# Patient Record
Sex: MALE | Race: WHITE | Employment: FULL TIME | URBAN - METROPOLITAN AREA
[De-identification: names, ages, dates, MRNs, and addresses within clinical notes are randomized per-mention and may not be internally consistent; named-entity substitution may affect disease eponyms.]

---

## 2024-04-22 ENCOUNTER — APPOINTMENT (OUTPATIENT)
Dept: GENERAL RADIOLOGY | Facility: CLINIC | Age: 64
End: 2024-04-22
Attending: EMERGENCY MEDICINE
Payer: OTHER MISCELLANEOUS

## 2024-04-22 ENCOUNTER — HOSPITAL ENCOUNTER (EMERGENCY)
Facility: CLINIC | Age: 64
Discharge: HOME | End: 2024-04-22
Attending: EMERGENCY MEDICINE | Admitting: EMERGENCY MEDICINE
Payer: OTHER MISCELLANEOUS

## 2024-04-22 VITALS
WEIGHT: 210 LBS | RESPIRATION RATE: 19 BRPM | OXYGEN SATURATION: 100 % | HEIGHT: 71 IN | SYSTOLIC BLOOD PRESSURE: 143 MMHG | TEMPERATURE: 98.7 F | HEART RATE: 61 BPM | BODY MASS INDEX: 29.4 KG/M2 | DIASTOLIC BLOOD PRESSURE: 94 MMHG

## 2024-04-22 DIAGNOSIS — M25.511 ACUTE PAIN OF RIGHT SHOULDER: ICD-10-CM

## 2024-04-22 DIAGNOSIS — S43.014A ANTERIOR SHOULDER DISLOCATION, RIGHT, INITIAL ENCOUNTER: ICD-10-CM

## 2024-04-22 PROCEDURE — 96376 TX/PRO/DX INJ SAME DRUG ADON: CPT | Performed by: EMERGENCY MEDICINE

## 2024-04-22 PROCEDURE — 99285 EMERGENCY DEPT VISIT HI MDM: CPT | Mod: 25 | Performed by: EMERGENCY MEDICINE

## 2024-04-22 PROCEDURE — 999N000065 XR SHOULDER RIGHT 2 VIEWS: Mod: RT

## 2024-04-22 PROCEDURE — 73030 X-RAY EXAM OF SHOULDER: CPT | Mod: RT

## 2024-04-22 PROCEDURE — 250N000011 HC RX IP 250 OP 636: Performed by: EMERGENCY MEDICINE

## 2024-04-22 PROCEDURE — 99285 EMERGENCY DEPT VISIT HI MDM: CPT | Performed by: EMERGENCY MEDICINE

## 2024-04-22 PROCEDURE — 96374 THER/PROPH/DIAG INJ IV PUSH: CPT | Performed by: EMERGENCY MEDICINE

## 2024-04-22 PROCEDURE — 73060 X-RAY EXAM OF HUMERUS: CPT | Mod: RT

## 2024-04-22 PROCEDURE — 96375 TX/PRO/DX INJ NEW DRUG ADDON: CPT | Performed by: EMERGENCY MEDICINE

## 2024-04-22 RX ORDER — LOSARTAN POTASSIUM 50 MG/1
50 TABLET ORAL EVERY MORNING
COMMUNITY

## 2024-04-22 RX ORDER — SIMVASTATIN 40 MG
40 TABLET ORAL AT BEDTIME
COMMUNITY

## 2024-04-22 RX ORDER — HYDROMORPHONE HYDROCHLORIDE 1 MG/ML
0.5 INJECTION, SOLUTION INTRAMUSCULAR; INTRAVENOUS; SUBCUTANEOUS ONCE
Status: COMPLETED | OUTPATIENT
Start: 2024-04-22 | End: 2024-04-22

## 2024-04-22 RX ORDER — KETOROLAC TROMETHAMINE 15 MG/ML
15 INJECTION, SOLUTION INTRAMUSCULAR; INTRAVENOUS ONCE
Status: COMPLETED | OUTPATIENT
Start: 2024-04-22 | End: 2024-04-22

## 2024-04-22 RX ADMIN — KETOROLAC TROMETHAMINE 15 MG: 15 INJECTION, SOLUTION INTRAMUSCULAR; INTRAVENOUS at 04:56

## 2024-04-22 RX ADMIN — HYDROMORPHONE HYDROCHLORIDE 0.5 MG: 1 INJECTION, SOLUTION INTRAMUSCULAR; INTRAVENOUS; SUBCUTANEOUS at 03:58

## 2024-04-22 RX ADMIN — HYDROMORPHONE HYDROCHLORIDE 0.5 MG: 1 INJECTION, SOLUTION INTRAMUSCULAR; INTRAVENOUS; SUBCUTANEOUS at 04:53

## 2024-04-22 ASSESSMENT — COLUMBIA-SUICIDE SEVERITY RATING SCALE - C-SSRS
2. HAVE YOU ACTUALLY HAD ANY THOUGHTS OF KILLING YOURSELF IN THE PAST MONTH?: NO
1. IN THE PAST MONTH, HAVE YOU WISHED YOU WERE DEAD OR WISHED YOU COULD GO TO SLEEP AND NOT WAKE UP?: NO
6. HAVE YOU EVER DONE ANYTHING, STARTED TO DO ANYTHING, OR PREPARED TO DO ANYTHING TO END YOUR LIFE?: NO

## 2024-04-22 ASSESSMENT — ACTIVITIES OF DAILY LIVING (ADL)
ADLS_ACUITY_SCORE: 35

## 2024-04-22 NOTE — DISCHARGE INSTRUCTIONS
Instructions from your doctor today:  Emergency Department (ED) testing is focused on the potential causes of your symptoms that are the most dangerous possibilities, and cannot cover every possibility. Based on the evaluation, it was deemed sufficiently safe to discharge and continue management through the clinics. Thus, follow-up is very important to assess for improvement/worsening, potential further testing, and potential treatment adjustments. If you were given opioid pain medications or other medications that can make you drowsy while in the ED, you should not drive for at least several hours and not until you feel completely back to normal.     Please make an appointment to follow up with:  - Orthopedics Clinic (phone: [phone]) in about 7 days. A referral has been placed and they should call you to schedule. If you do not get a call by this coming afternoon then call the above number.   - If you do not have a primary care provider, you can be seen in follow-up and establish care by calling any of the clinics below:     - [facility] (phone: [phone])     - Primary Care / [facility] (phone: [phone])   - Have your clinic provider review the results from today's visit with you again, including any potential follow-up or additional testing that may be needed based on the results. Occasionally, incidental findings are found on later review by radiologists that may need follow-up.     Return to the Emergency Department immediately if you have worsening symptoms, or any other urgent health concerns.

## 2024-04-22 NOTE — ED TRIAGE NOTES
Pt fell at work at work and states landed on right side arm got jammed.      Triage Assessment (Adult)       Row Name 04/22/24 0315          Triage Assessment    Airway WDL WDL        Respiratory WDL    Respiratory WDL WDL        Skin Circulation/Temperature WDL    Skin Circulation/Temperature WDL WDL        Cardiac WDL    Cardiac WDL WDL        Peripheral/Neurovascular WDL    Peripheral Neurovascular WDL WDL        Cognitive/Neuro/Behavioral WDL    Cognitive/Neuro/Behavioral WDL WDL

## 2024-04-22 NOTE — LETTER
April 22, 2024      To Whom It May Concern:      Damien Rasmussen was seen in our Emergency Department today, 04/22/24. He may return to work/school with restriction: no use right arm. Restricted for: at least 2 weeks, may be extended by ortho clinic.      Sincerely,        Marek Agrawal MD  Emergency Medicine

## 2024-04-22 NOTE — ED PROVIDER NOTES
"  History     Chief Complaint   Patient presents with    Shoulder Pain     Pt states jpain just below right shoulder after a fall    Arm Pain     Right arm     HPI  Damien Rasmussen is a 63 year old male without pertinent PMH who presents to the ED with right upper arm pain from a fall.  Patient points to right shoulder and upper arm area as location.  Patient reports that about an hour prior to arrival he was at work and pushing an item forward and up over his head.  His feet slipped and he fell forward with his arm extended, landing on the arm.  He denies other areas of injury.  He reports being in normal state of health recently otherwise.  Pain exacerbated with arm movement, has taken no medications yet for pain.    Physical Exam   BP: (!) 164/93  Pulse: 61  Temp: 98  F (36.7  C)  Resp: 19  Height: 180.3 cm (5' 11\")  Weight: 95.3 kg (210 lb)  SpO2: 100 %    Physical Exam  General: Appears uncomfortable, holding right arm. Appears stated age.   HENT: MMM, no oropharyngeal lesions  Eyes: PERRL, normal sclerae   Cardio: Regular rate. Regular rhythm. Extremities well perfused, 2+ radial pulses  Resp: Normal work of breathing, Normal respiratory rate.   MSK: Right upper arm: Palpably anteriorly dislocated, tenderness also along the proximal and mid humerus.  Neuro: alert without signs of confusion. CN II-XII grossly intact. Grossly normal strength and sensation in all extremities. Right hand with intact radial, ulnar, and median motor and sensory function.   Psych: normal affect, normal behavior      ED Course      [facility]    -Dislocation - Upper Extremity    Date/Time: 4/22/2024 6:29 AM    Performed by: Marek Agrawal MD  Authorized by: Marek Agrawal MD    Risks, benefits and alternatives discussed.      LOCATION     Location:  Shoulder    Shoulder location:  R shoulder    Shoulder dislocation type: anterior      Chronicity:  New    PRE PROCEDURE " ASSESSMENT      Pre-procedure imaging:  X-ray    Imaging findings: dislocation present      Imaging findings: no fracture      Distal perfusion: normal      ANESTHESIA (see MAR for exact dosages)      Anesthesia method:  None    PROCEDURE DETAILS      Manipulation performed: yes      Shoulder reduction method:  Traction and counter traction and external rotation    Reduction successful: yes      Reduction confirmed with imaging: yes      Immobilization:  Brace    Supplies used: shoulder immobilizer.    POST PROCEDURE DETAILS     Neurological function: normal      Distal perfusion: normal        PROCEDURE    Patient Tolerance:  Patient tolerated the procedure well with no immediate complications                Labs Ordered and Resulted from Time of ED Arrival to Time of ED Departure - No data to display  Humerus XR, G/E 2 views, right   Final Result   IMPRESSION: An anterior shoulder dislocation is present. No acute fracture. Acromioclavicular joint space is preserved.      XR Shoulder Right 2 Views   Final Result   IMPRESSION: An anterior shoulder dislocation is present. No acute fracture. Acromioclavicular joint space is preserved.      XR Shoulder Right 2 Views    (Results Pending)        Medical Decision Making  The patient's presentation was of moderate complexity (an acute complicated injury).    The patient's evaluation involved:  ordering and/or review of 3+ test(s) in this encounter (see separate area of note for details)  independent interpretation of testing performed by another health professional (XRs)    The patient's management necessitated high risk (a decision regarding emergency major procedure (dislocation reduction)).      Assessments & Plan   Patient presenting with shoulder area pain after fall.  Exam consistent with glenohumeral anterior dislocation, concern for possible associated humerus fracture.     Shoulder and humerus x-rays confirmed dislocation, showed no evidence of fracture.   Discussed multiple options for pain treatment to allow for reduction including IV pain medications, scalene block, and sedation.  Patient in favor of IV pain medications only for initial reduction attempt, wishing to avoid sedation if possible.      In the ED, the patient's symptoms were managed with hydromorphone and ketorolac, with improvement in symptoms upon reassessment.     Reduction performed with initial attempt using muscle massage and external rotation, which was unsuccessful.  Traction/countertraction then used with successful reduction.  Shoulder immobilizer placed.  CMS intact.  Postreduction x-ray confirms successful reduction.    The complete clinical picture is most consistent with right anterior shoulder dislocation. After counseling on the diagnosis, work-up, and treatment plan, the patient was discharged to home with his wife will drive him.  Work note provided.  The patient was advised to follow-up with orthopedics in about a week. The patient was advised to return to the ED if worsening symptoms, or any urgent health concerns.     Final diagnoses:   Anterior shoulder dislocation, right, initial encounter   Acute pain of right shoulder     Discharge Medication List as of 4/22/2024  6:29 AM        --  Marek Agrawal MD   Emergency Medicine   [facility] EMERGENCY DEPARTMENT  4/22/2024       Marek Agrawal MD  04/22/24 0637

## 2024-04-23 ENCOUNTER — OFFICE VISIT (OUTPATIENT)
Dept: ORTHOPEDICS | Facility: CLINIC | Age: 64
End: 2024-04-23
Payer: OTHER MISCELLANEOUS

## 2024-04-23 VITALS — WEIGHT: 210 LBS | HEIGHT: 71 IN | BODY MASS INDEX: 29.4 KG/M2

## 2024-04-23 DIAGNOSIS — S43.014A ANTERIOR SHOULDER DISLOCATION, RIGHT, INITIAL ENCOUNTER: ICD-10-CM

## 2024-04-23 PROCEDURE — 99204 OFFICE O/P NEW MOD 45 MIN: CPT | Performed by: PEDIATRICS

## 2024-04-23 NOTE — LETTER
4/23/2024         RE: Damien Rasmussen  [address] [address]        Dear Colleague,    Thank you for referring your patient, Damien Rasmussen, to the [facility]. Please see a copy of my visit note below.    ASSESSMENT & PLAN    Damien was seen today for injury.    Diagnoses and all orders for this visit:    Anterior shoulder dislocation, right, initial encounter  -     Orthopedic  Referral  -     Cancel: MR Shoulder Right w/o Contrast; Future  -     MR Shoulder Right w/o Contrast; Future      Prognosis not clear. We discussed that if acute significant rotator cuff tear, may refer to ortho surgery.  Continue with support and rest.  MRI next.  Questions answered. Discussed signs and symptoms that may indicate more serious issues; the patient was instructed to seek appropriate care if noted. Damien indicates understanding of these issues and agrees with the plan.    See Patient Instructions  Patient Instructions   Reviewed nature of the right shoulder injury, initial anterior dislocation, with subsequent reduction.  We also discussed potential for soft tissue injury in the shoulder, including related to the rotator cuff, given the dislocation.  Plan to continue with support for the shoulder.  May continue with routine use of the immobilizer, or also reviewed use of sling as alternative.  Plan MRI of the right shoulder next, given trauma to the shoulder and dysfunction.  Plan to remain off work at this time, with updated letter for work provided today.    Advanced imaging is done by appointment. Please call [facility] ([facility]/[place]) [phone] , [facility] ([facility]/[place]) [phone] , or [facility] ([facility]) [phone]  to schedule your MRI.     Some insurance companies may require a prior authorization to be completed which can delay the time until you are able  "to schedule your appointment.       If you are active on [employer], you may have access to your test results before your provider is able to review the study and advise on next steps.      The clinic will contact you with results either via phone or [employer]. If you have not heard from the clinic within 2-3 days following your MRI, please contact us at [phone] or via [employer].  Alternatively, if interested in further discussion with me about MRI findings and next steps, feel free to schedule a telephone visit, video visit, or recheck appointment in clinic.        If you have any further questions for your physician or physician s care team you can contact them thru [employer] or by calling [phone].      [facility]    -----  Chief Complaint   Patient presents with     Right Shoulder - Injury       SUBJECTIVE  Damien Rasmussen is a/an 63 year old male who is seen as an ER referral for evaluation of right shoulder.     The patient is seen by themselves.  The patient is Right handed    Onset: 1 day(s) ago 4/22/24. Patient describes injury as slip and fall while at work.   Location of Pain: right shoulder, lateral shoulder  Worsened by: moving or bumping the shoulder  Better with:   Treatments tried:  Shoulder immobilizer, ibuprofen  Associated symptoms: no distal numbness or tingling; denies swelling or warmth    Orthopedic/Surgical history: NO  Social History/Occupation:     Worker's comp injury      **  Above information per rooming staff.  Additional history:  Lost footing with right arm in flexed position. Dislocated right shoulder, subsequent reduction in ED. Wearing immobilizer.  Currently some throbbing at times right lateral shoulder.  Some anterior right shoulder swelling.          REVIEW OF SYSTEMS:  Review of Systems    OBJECTIVE:  Ht 1.803 m (5' 11\")   Wt 95.3 kg (210 lb)   BMI 29.29 kg/m           Right Shoulder exam    ROM:    "   forward flexion able to initiate, limited, 10-20 deg        abduction able to initiate, limited, 10-20 deg       internal rotation hand to abdomen with some pain       external rotation ~neutral    Tender: lateral shoulder, subacromial area    Strength: able to resist above motions, limited testing with pain    Impingement testing: deferred    Sensation:      normal sensation over shoulder and upper extremity with light touch, including lateral shoulder      RADIOLOGY:  Final results and radiologist's interpretation, available in the [facility] health record.  Images were reviewed with the patient in the office today.  My personal interpretation of the performed imaging: initial anterior shoulder dislocation, with subsequent reduction        EXAM: XR SHOULDER RIGHT 2 VIEWS  LOCATION: [facility]  DATE: 4/22/2024     INDICATION: Right shoulder dislocation. Post reduction film.  COMPARISON: X-ray right shoulder 2 views 04/22/2024 at 0412 hours.                                                                      IMPRESSION: Interval successful reduction of the dislocated right humeral head relative to the glenoid. Anatomic alignment. No fracture, dislocation or x-ray evidence of avascular necrosis.            EXAM: XR SHOULDER RIGHT 2 VIEWS, XR HUMERUS RIGHT G/E 2 VIEWS  LOCATION: [facility]  DATE: 4/22/2024     INDICATION: fall, likely dislocation  COMPARISON: None.                                                                      IMPRESSION: An anterior shoulder dislocation is present. No acute fracture. Acromioclavicular joint space is preserved.          EXAM: XR SHOULDER RIGHT 2 VIEWS, XR HUMERUS RIGHT G/E 2 VIEWS  LOCATION: [facility]  DATE: 4/22/2024     INDICATION: fall, likely dislocation  COMPARISON: None.                                                                       IMPRESSION: An anterior shoulder dislocation is present. No acute fracture. Acromioclavicular joint space is preserved.          Review of prior external note(s) from - ED  Review of the result(s) of each unique test - imaging  Independent interpretation of a test performed by another physician/other qualified health care professional (not separately reported) - imaging  Ordering of each unique test             Again, thank you for allowing me to participate in the care of your patient.        Sincerely,        Robert Suarez DO

## 2024-04-23 NOTE — LETTER
REPORT OF WORK ABILITY    NOTE TO EMPLOYEE: You must promptly provide a copy of this report to your  employer or worker's compensation insurer, and Qualified Rehabilitation Consultant.    Date: 4/23/2024                     Employee Name: Damien Rasmussen         YOB: 1960  Medical Record Number: [id number]   Soc.Sec.No: [id number]  Employer: None                Date of Injury: 4/22/24  Managed Care Organization / Insurance Company Name: UNKNOWN    Diagnosis: right shoulder dislocation  Work Related: yes     MMI: NO  Permanent Partial Disability (PPD) likely: UNKNOWN    EMPLOYEE IS ABLE TO WORK: OFF Work until cleared. Anticipate update on work status within the next 10-14 days.     RESTRICTIONS IF ANY:    Stand:    Sit:    Walk:   Lift, carry no more than:    Push/Pull:    Shoulder/Elbow:    Hand/Wrist:    Ladder/Stair climb:    Reach Above Shoulders:      OTHER RESTRICTIONS: None    TREATMENT PLAN/NOTES: MRI - as ordered, shoulder support routinely, and cold pack for comfort and Rest as needed.          Robert MUSA

## 2024-04-23 NOTE — PROGRESS NOTES
ASSESSMENT & PLAN    Damien was seen today for injury.    Diagnoses and all orders for this visit:    Anterior shoulder dislocation, right, initial encounter  -     Orthopedic  Referral  -     Cancel: MR Shoulder Right w/o Contrast; Future  -     MR Shoulder Right w/o Contrast; Future      Prognosis not clear. We discussed that if acute significant rotator cuff tear, may refer to ortho surgery.  Continue with support and rest.  MRI next.  Questions answered. Discussed signs and symptoms that may indicate more serious issues; the patient was instructed to seek appropriate care if noted. Damien indicates understanding of these issues and agrees with the plan.    See Patient Instructions  Patient Instructions   Reviewed nature of the right shoulder injury, initial anterior dislocation, with subsequent reduction.  We also discussed potential for soft tissue injury in the shoulder, including related to the rotator cuff, given the dislocation.  Plan to continue with support for the shoulder.  May continue with routine use of the immobilizer, or also reviewed use of sling as alternative.  Plan MRI of the right shoulder next, given trauma to the shoulder and dysfunction.  Plan to remain off work at this time, with updated letter for work provided today.    Advanced imaging is done by appointment. Please call [facility] ([facility]/[place]) [phone] , [facility] ([facility]/[place]) [phone] , or [facility] ([facility]) [phone]  to schedule your MRI.     Some insurance companies may require a prior authorization to be completed which can delay the time until you are able to schedule your appointment.       If you are active on [employer], you may have access to your test results before your provider is able to review the study and advise on next steps.      The clinic will contact you with results either via phone or [employer]. If  "you have not heard from the clinic within 2-3 days following your MRI, please contact us at [phone] or via [employer].  Alternatively, if interested in further discussion with me about MRI findings and next steps, feel free to schedule a telephone visit, video visit, or recheck appointment in clinic.        If you have any further questions for your physician or physician s care team you can contact them thru [employer] or by calling [phone].      Robert Suarez [facility]    -----  Chief Complaint   Patient presents with    Right Shoulder - Injury       SUBJECTIVE  Damien Rasmussen is a/an 63 year old male who is seen as an ER referral for evaluation of right shoulder.     The patient is seen by themselves.  The patient is Right handed    Onset: 1 day(s) ago 4/22/24. Patient describes injury as slip and fall while at work.   Location of Pain: right shoulder, lateral shoulder  Worsened by: moving or bumping the shoulder  Better with:   Treatments tried:  Shoulder immobilizer, ibuprofen  Associated symptoms: no distal numbness or tingling; denies swelling or warmth    Orthopedic/Surgical history: NO  Social History/Occupation:     Worker's comp injury      **  Above information per rooming staff.  Additional history:  Lost footing with right arm in flexed position. Dislocated right shoulder, subsequent reduction in ED. Wearing immobilizer.  Currently some throbbing at times right lateral shoulder.  Some anterior right shoulder swelling.          REVIEW OF SYSTEMS:  Review of Systems    OBJECTIVE:  Ht 1.803 m (5' 11\")   Wt 95.3 kg (210 lb)   BMI 29.29 kg/m           Right Shoulder exam    ROM:      forward flexion able to initiate, limited, 10-20 deg        abduction able to initiate, limited, 10-20 deg       internal rotation hand to abdomen with some pain       external rotation ~neutral    Tender: lateral shoulder, subacromial area    Strength: able " to resist above motions, limited testing with pain    Impingement testing: deferred    Sensation:      normal sensation over shoulder and upper extremity with light touch, including lateral shoulder      RADIOLOGY:  Final results and radiologist's interpretation, available in the [facility] health record.  Images were reviewed with the patient in the office today.  My personal interpretation of the performed imaging: initial anterior shoulder dislocation, with subsequent reduction        EXAM: XR SHOULDER RIGHT 2 VIEWS  LOCATION: [facility]  DATE: 4/22/2024     INDICATION: Right shoulder dislocation. Post reduction film.  COMPARISON: X-ray right shoulder 2 views 04/22/2024 at 0412 hours.                                                                      IMPRESSION: Interval successful reduction of the dislocated right humeral head relative to the glenoid. Anatomic alignment. No fracture, dislocation or x-ray evidence of avascular necrosis.            EXAM: XR SHOULDER RIGHT 2 VIEWS, XR HUMERUS RIGHT G/E 2 VIEWS  LOCATION: [facility]  DATE: 4/22/2024     INDICATION: fall, likely dislocation  COMPARISON: None.                                                                      IMPRESSION: An anterior shoulder dislocation is present. No acute fracture. Acromioclavicular joint space is preserved.          EXAM: XR SHOULDER RIGHT 2 VIEWS, XR HUMERUS RIGHT G/E 2 VIEWS  LOCATION: [facility]  DATE: 4/22/2024     INDICATION: fall, likely dislocation  COMPARISON: None.                                                                      IMPRESSION: An anterior shoulder dislocation is present. No acute fracture. Acromioclavicular joint space is preserved.          Review of prior external note(s) from - ED  Review of the result(s) of each unique test - imaging  Independent interpretation of a  test performed by another physician/other qualified health care professional (not separately reported) - imaging  Ordering of each unique test

## 2024-04-23 NOTE — PATIENT INSTRUCTIONS
Reviewed nature of the right shoulder injury, initial anterior dislocation, with subsequent reduction.  We also discussed potential for soft tissue injury in the shoulder, including related to the rotator cuff, given the dislocation.  Plan to continue with support for the shoulder.  May continue with routine use of the immobilizer, or also reviewed use of sling as alternative.  Plan MRI of the right shoulder next, given trauma to the shoulder and dysfunction.  Plan to remain off work at this time, with updated letter for work provided today.    Advanced imaging is done by appointment. Please call [facility] ([facility]/[place]) [phone] , [facility] ([facility]/[place]) [phone] , or [facility] ([facility]) [phone]  to schedule your MRI.     Some insurance companies may require a prior authorization to be completed which can delay the time until you are able to schedule your appointment.       If you are active on [employer], you may have access to your test results before your provider is able to review the study and advise on next steps.      The clinic will contact you with results either via phone or [employer]. If you have not heard from the clinic within 2-3 days following your MRI, please contact us at [phone] or via [employer].  Alternatively, if interested in further discussion with me about MRI findings and next steps, feel free to schedule a telephone visit, video visit, or recheck appointment in clinic.        If you have any further questions for your physician or physician s care team you can contact them thru [employer] or by calling [phone].

## 2024-05-13 ENCOUNTER — ANCILLARY PROCEDURE (OUTPATIENT)
Dept: MRI IMAGING | Facility: CLINIC | Age: 64
End: 2024-05-13
Attending: PEDIATRICS
Payer: OTHER MISCELLANEOUS

## 2024-05-13 DIAGNOSIS — S43.014A ANTERIOR SHOULDER DISLOCATION, RIGHT, INITIAL ENCOUNTER: ICD-10-CM

## 2024-05-13 PROCEDURE — 73221 MRI JOINT UPR EXTREM W/O DYE: CPT | Mod: RT | Performed by: RADIOLOGY

## 2024-05-19 ENCOUNTER — HEALTH MAINTENANCE LETTER (OUTPATIENT)
Age: 64
End: 2024-05-19

## 2024-05-20 ENCOUNTER — OFFICE VISIT (OUTPATIENT)
Dept: ORTHOPEDICS | Facility: CLINIC | Age: 64
End: 2024-05-20
Payer: OTHER MISCELLANEOUS

## 2024-05-20 DIAGNOSIS — S46.011D TRAUMATIC COMPLETE TEAR OF RIGHT ROTATOR CUFF, SUBSEQUENT ENCOUNTER: Primary | ICD-10-CM

## 2024-05-20 DIAGNOSIS — S43.014D ANTERIOR DISLOCATION OF RIGHT SHOULDER, SUBSEQUENT ENCOUNTER: ICD-10-CM

## 2024-05-20 PROCEDURE — 99213 OFFICE O/P EST LOW 20 MIN: CPT | Performed by: PEDIATRICS

## 2024-05-20 NOTE — LETTER
5/20/2024         RE: Damien Rasmussen  [address] [facility] [address]        Dear Colleague,    Thank you for referring your patient, Damien Rasmussen, to the [facility]. Please see a copy of my visit note below.    ASSESSMENT & PLAN    Damien was seen today for follow up.    Diagnoses and all orders for this visit:    Traumatic complete tear of right rotator cuff, subsequent encounter  -     Orthopedic  Referral; Future    Anterior dislocation of right shoulder, subsequent encounter  -     Orthopedic  Referral; Future      See Patient Instructions  Patient Instructions   Reviewed right shoulder MRI, demonstrating full-thickness tendon tearing the rotator cuff.  With these findings, advise further discussion with orthopedic surgery next, referral has been placed through orthopedic .  Continue with previously placed work restrictions, remaining off work for now given nature of dysfunction after this injury.  Will leave follow-up here open-ended.  Contact clinic for any other questions or concerns.    If you have any further questions for your physician or physician s care team you can contact them thru [employer] [email or web address] or by calling [phone].      Robert Suarez,   [facility]    SUBJECTIVE- Interim History May 20, 2024    Chief Complaint   Patient presents with     Right Shoulder - Follow Up       Damien Rasmussen is a 63 year old male who is seen in f/u up for right shoulder injury. Since last visit on 4/23/24 patient has felt slight improvement in pain, as well as the bruising (located primarily in the upper arm today), but still has painful motion with reaching or lifting the arm. No interim fall or injury.    - Now ~ 1 month (-) from initial onset (4/22/24)    The patient is seen by themselves.  The patient is Right handed    Orthopedic/Surgical history: NO  Social History/Occupation: machine        REVIEW OF SYSTEMS:  Review of Systems    OBJECTIVE:  There were no vitals taken for this visit.       Right shoulder with limited active motion away from body, with pain    RADIOLOGY:  Final results and radiologist's interpretation, available in the [facility] health record.  Images were reviewed with the patient in the office today.  My personal interpretation of the performed imaging: full thickness rotator cuff tendon tearing.      Exam: MRI of the right shoulder dated 5/15/2024.     COMPARISON: Radiographs dated 4/22/2024.     CLINICAL HISTORY: Anterior shoulder dislocation.     TECHNIQUE: Multiplanar, multisequence MR imaging of the right shoulder  was obtained using standard sequences and orthogonal plane without the  use of intravenous or intra-articular gadolinium contrast.     FINDINGS:     Small amount of fluid in the right shoulder glenohumeral joint.  Moderate amount of fluid in the subacromial subdeltoid bursa with  minimal fluid in the subscapularis recess.     No marrow signal abnormalities to suggest fracture or marrow  infiltration.     At least moderate osteoarthrosis at the acromioclavicular joint. No os  acromiale. The coracohumeral, coracoclavicular, and coracoacromial  ligaments are intact. Preserved subcoracoid fat. Mild thickening of  the inferior glenohumeral ligaments.     There is a full-thickness, complete tear of the supraspinatus and  infraspinatus tendons, with retraction of the torn fibers to the level  of the glenohumeral joint. The teres minor tendon is intact. Severe  tendinosis of the subscapularis tendon with marked thickening and  edema, and partial-thickness tearing.      Minimal fatty infiltration within the rotator cuff musculature,  greatest within the teres minor muscle. Minimal soft tissue edema.     There is tendinosis of the biceps tendon, which is subluxed at the  bicipital groove. Attenuation of the intra-articular portion of the  biceps tendon.     The humeral  head is well aligned with the glenoid. Small likely  chronic Hill-Sachs deformity. Fraying of the articular cartilage along  the glenoid. Marked degenerative tearing of the labrum. Questionable  subtle bone marrow edema along the anterior inferior glenoid without  true bony Bankart lesion.                                                                      IMPRESSION:  1. Small right shoulder joint effusion with mild synovitis.     2. Moderate osteoarthrosis of the right shoulder acromioclavicular  joint.     3. Full-thickness, complete tears of the right shoulder supraspinatus  and infraspinatus tendons with retraction of the torn fibers to the  level of the glenohumeral joint.     3. Severe tendinosis of the right shoulder subscapularis tendon with  marked increased signal and thickening and partial thickness tearing.     4. Mild fatty infiltration within the rotator cuff musculature,  greatest within the teres minor muscle.     5. Subluxation of the biceps tendon at the level of the bicipital  groove with tendinosis. The intra-articular portion of the biceps  tendon is attenuated and likely partially torn.     6. Likely chronic Hill-Sachs deformity. No definite bony Bankart  injury although there is a subtle focus of bone marrow edema in the  anterior inferior glenoid. Diffuse labral tearing.     CLARK REYNOLDS MD         Again, thank you for allowing me to participate in the care of your patient.        Sincerely,        Robert Suarez,

## 2024-05-20 NOTE — PROGRESS NOTES
ASSESSMENT & PLAN    Damien was seen today for follow up.    Diagnoses and all orders for this visit:    Traumatic complete tear of right rotator cuff, subsequent encounter  -     Orthopedic  Referral; Future    Anterior dislocation of right shoulder, subsequent encounter  -     Orthopedic  Referral; Future      See Patient Instructions  Patient Instructions   Reviewed right shoulder MRI, demonstrating full-thickness tendon tearing the rotator cuff.  With these findings, advise further discussion with orthopedic surgery next, referral has been placed through orthopedic .  Continue with previously placed work restrictions, remaining off work for now given nature of dysfunction after this injury.  Will leave follow-up here open-ended.  Contact clinic for any other questions or concerns.    If you have any further questions for your physician or physician s care team you can contact them thru [employer] or by calling [phone].      Robert Suarez, [facility]    SUBJECTIVE- Interim History May 20, 2024    Chief Complaint   Patient presents with    Right Shoulder - Follow Up       Damien Rasmussen is a 63 year old male who is seen in f/u up for right shoulder injury. Since last visit on 4/23/24 patient has felt slight improvement in pain, as well as the bruising (located primarily in the upper arm today), but still has painful motion with reaching or lifting the arm. No interim fall or injury.    - Now ~ 1 month (-) from initial onset (4/22/24)    The patient is seen by themselves.  The patient is Right handed    Orthopedic/Surgical history: NO  Social History/Occupation:       REVIEW OF SYSTEMS:  Review of Systems    OBJECTIVE:  There were no vitals taken for this visit.       Right shoulder with limited active motion away from body, with pain    RADIOLOGY:  Final results and radiologist's interpretation, available in the [facility]  record.  Images were reviewed with the patient in the office today.  My personal interpretation of the performed imaging: full thickness rotator cuff tendon tearing.      Exam: MRI of the right shoulder dated 5/15/2024.     COMPARISON: Radiographs dated 4/22/2024.     CLINICAL HISTORY: Anterior shoulder dislocation.     TECHNIQUE: Multiplanar, multisequence MR imaging of the right shoulder  was obtained using standard sequences and orthogonal plane without the  use of intravenous or intra-articular gadolinium contrast.     FINDINGS:     Small amount of fluid in the right shoulder glenohumeral joint.  Moderate amount of fluid in the subacromial subdeltoid bursa with  minimal fluid in the subscapularis recess.     No marrow signal abnormalities to suggest fracture or marrow  infiltration.     At least moderate osteoarthrosis at the acromioclavicular joint. No os  acromiale. The coracohumeral, coracoclavicular, and coracoacromial  ligaments are intact. Preserved subcoracoid fat. Mild thickening of  the inferior glenohumeral ligaments.     There is a full-thickness, complete tear of the supraspinatus and  infraspinatus tendons, with retraction of the torn fibers to the level  of the glenohumeral joint. The teres minor tendon is intact. Severe  tendinosis of the subscapularis tendon with marked thickening and  edema, and partial-thickness tearing.      Minimal fatty infiltration within the rotator cuff musculature,  greatest within the teres minor muscle. Minimal soft tissue edema.     There is tendinosis of the biceps tendon, which is subluxed at the  bicipital groove. Attenuation of the intra-articular portion of the  biceps tendon.     The humeral head is well aligned with the glenoid. Small likely  chronic Hill-Sachs deformity. Fraying of the articular cartilage along  the glenoid. Marked degenerative tearing of the labrum. Questionable  subtle bone marrow edema along the anterior inferior glenoid without  true bony  Bankart lesion.                                                                      IMPRESSION:  1. Small right shoulder joint effusion with mild synovitis.     2. Moderate osteoarthrosis of the right shoulder acromioclavicular  joint.     3. Full-thickness, complete tears of the right shoulder supraspinatus  and infraspinatus tendons with retraction of the torn fibers to the  level of the glenohumeral joint.     3. Severe tendinosis of the right shoulder subscapularis tendon with  marked increased signal and thickening and partial thickness tearing.     4. Mild fatty infiltration within the rotator cuff musculature,  greatest within the teres minor muscle.     5. Subluxation of the biceps tendon at the level of the bicipital  groove with tendinosis. The intra-articular portion of the biceps  tendon is attenuated and likely partially torn.     6. Likely chronic Hill-Sachs deformity. No definite bony Bankart  injury although there is a subtle focus of bone marrow edema in the  anterior inferior glenoid. Diffuse labral tearing.     CLARK REYNOLDS MD

## 2024-05-20 NOTE — PATIENT INSTRUCTIONS
Reviewed right shoulder MRI, demonstrating full-thickness tendon tearing the rotator cuff.  With these findings, advise further discussion with orthopedic surgery next, referral has been placed through orthopedic .  Continue with previously placed work restrictions, remaining off work for now given nature of dysfunction after this injury.  Will leave follow-up here open-ended.  Contact clinic for any other questions or concerns.    If you have any further questions for your physician or physician s care team you can contact them thru [email or web address] or by calling [phone].

## 2024-05-20 NOTE — LETTER
REPORT OF WORK ABILITY    NOTE TO EMPLOYEE: You must promptly provide a copy of this report to your  employer or worker's compensation insurer, and Qualified Rehabilitation Consultant.    Date: May 20, 2024                     Employee Name: Damien Rasmussen         YOB: 1960  Medical Record Number: [id number]   Soc.Sec.No: [id number]  Employer: None                Date of Injury: 4/22/24  Managed Care Organization / Insurance Company Name: UNKNOWN    Diagnosis: right shoulder dislocation with full thickness rotator cuff tendon tearing  Work Related: yes     MMI: NO  Permanent Partial Disability (PPD) likely: UNKNOWN    EMPLOYEE IS ABLE TO WORK: OFF Work until cleared.      RESTRICTIONS IF ANY:    Stand:    Sit:    Walk:   Lift, carry no more than:    Push/Pull:    Shoulder/Elbow:    Hand/Wrist:    Ladder/Stair climb:    Reach Above Shoulders:      OTHER RESTRICTIONS: None    TREATMENT PLAN/NOTES: may shoulder support routinely, and cold pack for comfort and Rest as needed. Referred to orthopedic surgery for further discussion next.          Robert MUSA

## 2024-05-28 NOTE — PROGRESS NOTES
SUBJECTIVE:  Damien Rasmussen is a 63 year old male who is seen in consultation at the request of Dr. Suarez for a right shoulder injury that occurred on 4/22/24.  Shoulder dislocation: he was at work and pushing an item forward and up over his head. His feet slipped and he fell forward with his arm extended, landing on the arm   Anterior shoulder dislocation was reduced in the ER.    Present symptoms: cannot lift the right arm due to weakness  Pain is mild  Associated symptoms: no neuro symptoms     Treatment up to this point: shoulder immobilizer x 3 weeks.    Ortho PMH:  reports no previous shoulder problems or injuries in the past.    Past Medical History: No past medical history on file.  Past Surgical History: No past surgical history on file.  Family History: No family history on file.  Social History:   Social History     Tobacco Use    Smoking status: Former     Types: Cigarettes    Smokeless tobacco: Never   Substance Use Topics    Alcohol use: Not Currently       Review of Systems:  Constitutional:  NEGATIVE for fever, chills, change in weight  Integumentary/Skin:  NEGATIVE for worrisome rashes, moles or lesions  Eyes:  NEGATIVE for vision changes or irritation  ENT/Mouth:  NEGATIVE for ear, mouth and throat problems  Resp:  NEGATIVE for significant cough or SOB  Breast:  NEGATIVE for masses, tenderness or discharge  CV:  NEGATIVE for chest pain, palpitations or peripheral edema  GI:  NEGATIVE for nausea, abdominal pain, heartburn, or change in bowel habits  :  Negative   Musculoskeletal:  See HPI above  Neuro:  NEGATIVE for weakness, dizziness or paresthesias  Endocrine:  NEGATIVE for temperature intolerance, skin/hair changes  Heme/allergy/immune:  NEGATIVE for bleeding problems  Psychiatric:  NEGATIVE for changes in mood or affect    OBJECTIVE:  Physical Exam:  /75 (BP Location: Left arm, Patient Position: Sitting, Cuff Size: Adult Regular)   Pulse 69   SpO2 98%   General Appearance: healthy,  alert and no distress   Skin: no suspicious lesions or rashes  Neuro: Normal strength and tone, mentation intact and speech normal  Vascular: good pulses, and cappillary refill   Lymph: no lymphadenopathy   Psych:  mentation appears normal and affect normal/bright  Resp: no increased work of breathing    Neck Exam:  Cervical range of motion: full and does not cause neck pain or reproduce shoulder pain.  Sensory deficits: none  Motor deficits: none    Right Shoulder Exam:  Shoulder Inspection: no swelling, bruising, discoloration, or obvious deformity or asymmetry. Biceps madhavi deformity noted.  Infraspinatus muscle atrophy on right     Tender: Non-tender  Range of Motion:   Active: forward flexion: 30, internal rotation: hip pocket   Passive: forward flexion: 100*, external rotation: 30*  Strength: forward flexion: 3/5, external rotation: 3/5, belly press weak      X-rays:  Obtained Date of injury, were reviewed in the office with the patient:   successful reduction of the anteriorly dislocated right humeral head relative to the glenoid. Anatomic alignment. No fracture, dislocation       MRI: right shoulder  From 5/20/24 showed:  1. Small right shoulder joint effusion with mild synovitis.     2. Moderate osteoarthrosis of the right shoulder acromioclavicular  joint.     3. Full-thickness, complete tears of the right shoulder supraspinatus  and infraspinatus tendons with retraction of the torn fibers to the  level of the glenohumeral joint.     3. Severe tendinosis of the right shoulder subscapularis tendon with  marked increased signal and thickening and partial thickness tearing.    There appears to be a tear in the subscap to my view, at least the upper portion.     4. Mild fatty infiltration within the rotator cuff musculature,  greatest within the teres minor muscle.     5. Subluxation of the biceps tendon at the level of the bicipital  groove with tendinosis. The intra-articular portion of the biceps  tendon  is attenuated and likely partially torn.  The tendon appears torn to me.     6. Small likely chronic Hill-Sachs deformity. Fraying of the articular cartilage along  the glenoid. Marked degenerative tearing of the labrum. Questionable  subtle bone marrow edema along the anterior inferior glenoid without  true bony Bankart lesion.      ASSESSMENT:  Right shoulder 1st time dislocation with acute, very large rotator cuff tear involving the supraspinatus, infraspinatus, and at least part of the subscapularis.  Proximal biceps rupture and the expected labrum tearing is present.    Based on the appearance of the rotator cuff with retraction and infraspinatus atrophy mainly seen on exam, this would suggest that at least part of the tear may have been there for a while, making it easier for the glenohumeral joint to dislocate.  As I told the patient and his wife, this determination is very difficult to make.    PLAN:  I think rotator cuff repair should be attempted.  Acute repair has a higher success rate, so I don't think he should wait too long to have this.   Discussed that the biceps is likely not repairable, and the labrum can be debrided, but likely should not be repaired to avoid over tightening the shoulder.  We discussed that recovery will be quite long, and he will be out of work using that arm for possibly 6-9 months. He has a physical job.  I ordered some physical therapy to try to get some range of motion back.  Because of the massive nature of the tear, including the subscap, and the question of chronicity of the tear, I have referred him on to the shoulder service       WILNER Green MD  Dept. Orthopedic Surgery  [facility]

## 2024-05-29 ENCOUNTER — OFFICE VISIT (OUTPATIENT)
Dept: ORTHOPEDICS | Facility: CLINIC | Age: 64
End: 2024-05-29
Attending: PEDIATRICS
Payer: OTHER MISCELLANEOUS

## 2024-05-29 VITALS — OXYGEN SATURATION: 98 % | DIASTOLIC BLOOD PRESSURE: 75 MMHG | HEART RATE: 69 BPM | SYSTOLIC BLOOD PRESSURE: 139 MMHG

## 2024-05-29 DIAGNOSIS — M75.121 COMPLETE TEAR OF RIGHT ROTATOR CUFF, UNSPECIFIED WHETHER TRAUMATIC: ICD-10-CM

## 2024-05-29 DIAGNOSIS — Z02.6 ENCOUNTER RELATED TO WORKER'S COMPENSATION CLAIM: ICD-10-CM

## 2024-05-29 DIAGNOSIS — S46.111A RUPTURE LONG HEAD BICEPS TENDON, RIGHT, INITIAL ENCOUNTER: ICD-10-CM

## 2024-05-29 DIAGNOSIS — S43.431A TEAR OF RIGHT GLENOID LABRUM, INITIAL ENCOUNTER: ICD-10-CM

## 2024-05-29 DIAGNOSIS — S46.011D TRAUMATIC COMPLETE TEAR OF RIGHT ROTATOR CUFF, SUBSEQUENT ENCOUNTER: ICD-10-CM

## 2024-05-29 DIAGNOSIS — S43.014D ANTERIOR DISLOCATION OF RIGHT SHOULDER, SUBSEQUENT ENCOUNTER: ICD-10-CM

## 2024-05-29 DIAGNOSIS — S43.004A DISLOCATION OF RIGHT SHOULDER JOINT, INITIAL ENCOUNTER: Primary | ICD-10-CM

## 2024-05-29 PROCEDURE — 99204 OFFICE O/P NEW MOD 45 MIN: CPT | Performed by: ORTHOPAEDIC SURGERY

## 2024-05-29 ASSESSMENT — PAIN SCALES - GENERAL: PAINLEVEL: MILD PAIN (3)

## 2024-05-29 NOTE — LETTER
5/29/2024         RE: Damien Rasmussen  [address] [facility] [address]        Dear Colleague,    Thank you for referring your patient, Damien Rasmussen, to the [facility]. Please see a copy of my visit note below.    SUBJECTIVE:  Damien Rasmussen is a 63 year old male who is seen in consultation at the request of Dr. Suarez for a right shoulder injury that occurred on 4/22/24.  Shoulder dislocation: he was at work and pushing an item forward and up over his head. His feet slipped and he fell forward with his arm extended, landing on the arm   Anterior shoulder dislocation was reduced in the ER.    Present symptoms: cannot lift the right arm due to weakness  Pain is mild  Associated symptoms: no neuro symptoms     Treatment up to this point: shoulder immobilizer x 3 weeks.    Ortho PMH:  reports no previous shoulder problems or injuries in the past.    Past Medical History: No past medical history on file.  Past Surgical History: No past surgical history on file.  Family History: No family history on file.  Social History:   Social History     Tobacco Use     Smoking status: Former     Types: Cigarettes     Smokeless tobacco: Never   Substance Use Topics     Alcohol use: Not Currently       Review of Systems:  Constitutional:  NEGATIVE for fever, chills, change in weight  Integumentary/Skin:  NEGATIVE for worrisome rashes, moles or lesions  Eyes:  NEGATIVE for vision changes or irritation  ENT/Mouth:  NEGATIVE for ear, mouth and throat problems  Resp:  NEGATIVE for significant cough or SOB  Breast:  NEGATIVE for masses, tenderness or discharge  CV:  NEGATIVE for chest pain, palpitations or peripheral edema  GI:  NEGATIVE for nausea, abdominal pain, heartburn, or change in bowel habits  :  Negative   Musculoskeletal:  See HPI above  Neuro:  NEGATIVE for weakness, dizziness or paresthesias  Endocrine:  NEGATIVE for temperature intolerance, skin/hair changes  Heme/allergy/immune:  NEGATIVE  for bleeding problems  Psychiatric:  NEGATIVE for changes in mood or affect    OBJECTIVE:  Physical Exam:  /75 (BP Location: Left arm, Patient Position: Sitting, Cuff Size: Adult Regular)   Pulse 69   SpO2 98%   General Appearance: healthy, alert and no distress   Skin: no suspicious lesions or rashes  Neuro: Normal strength and tone, mentation intact and speech normal  Vascular: good pulses, and cappillary refill   Lymph: no lymphadenopathy   Psych:  mentation appears normal and affect normal/bright  Resp: no increased work of breathing    Neck Exam:  Cervical range of motion: full and does not cause neck pain or reproduce shoulder pain.  Sensory deficits: none  Motor deficits: none    Right Shoulder Exam:  Shoulder Inspection: no swelling, bruising, discoloration, or obvious deformity or asymmetry. Biceps madhavi deformity noted.  Infraspinatus muscle atrophy on right     Tender: Non-tender  Range of Motion:   Active: forward flexion: 30, internal rotation: hip pocket   Passive: forward flexion: 100*, external rotation: 30*  Strength: forward flexion: 3/5, external rotation: 3/5, belly press weak      X-rays:  Obtained Date of injury, were reviewed in the office with the patient:   successful reduction of the anteriorly dislocated right humeral head relative to the glenoid. Anatomic alignment. No fracture, dislocation       MRI: right shoulder  From 5/20/24 showed:  1. Small right shoulder joint effusion with mild synovitis.     2. Moderate osteoarthrosis of the right shoulder acromioclavicular  joint.     3. Full-thickness, complete tears of the right shoulder supraspinatus  and infraspinatus tendons with retraction of the torn fibers to the  level of the glenohumeral joint.     3. Severe tendinosis of the right shoulder subscapularis tendon with  marked increased signal and thickening and partial thickness tearing.    There appears to be a tear in the subscap to my view, at least the upper portion.     4.  Mild fatty infiltration within the rotator cuff musculature,  greatest within the teres minor muscle.     5. Subluxation of the biceps tendon at the level of the bicipital  groove with tendinosis. The intra-articular portion of the biceps  tendon is attenuated and likely partially torn.  The tendon appears torn to me.     6. Small likely chronic Hill-Sachs deformity. Fraying of the articular cartilage along  the glenoid. Marked degenerative tearing of the labrum. Questionable  subtle bone marrow edema along the anterior inferior glenoid without  true bony Bankart lesion.      ASSESSMENT:  Right shoulder 1st time dislocation with acute, very large rotator cuff tear involving the supraspinatus, infraspinatus, and at least part of the subscapularis.  Proximal biceps rupture and the expected labrum tearing is present.    Based on the appearance of the rotator cuff with retraction and infraspinatus atrophy mainly seen on exam, this would suggest that at least part of the tear may have been there for a while, making it easier for the glenohumeral joint to dislocate.  As I told the patient and his wife, this determination is very difficult to make.    PLAN:  I think rotator cuff repair should be attempted.  Acute repair has a higher success rate, so I don't think he should wait too long to have this.   Discussed that the biceps is likely not repairable, and the labrum can be debrided, but likely should not be repaired to avoid over tightening the shoulder.  We discussed that recovery will be quite long, and he will be out of work using that arm for possibly 6-9 months. He has a physical job.  I ordered some physical therapy to try to get some range of motion back.  Because of the massive nature of the tear, including the subscap, and the question of chronicity of the tear, I have referred him on to the shoulder service       WILNER Green MD  Dept. Orthopedic Surgery  [facility]        Again, thank you  for allowing me to participate in the care of your patient.        Sincerely,        Thiago Green MD

## 2024-05-30 ENCOUNTER — TELEPHONE (OUTPATIENT)
Dept: ORTHOPEDICS | Facility: CLINIC | Age: 64
End: 2024-05-30
Payer: COMMERCIAL

## 2024-05-30 ASSESSMENT — ACTIVITIES OF DAILY LIVING (ADL)
REACHING_FOR_SOMETHING_ON_A_HIGH_SHELF: 7
WASHING_YOUR_HAIR?: 10
PLACING_AN_OBJECT_ON_A_HIGH_SHELF: 10
PUTTING_ON_YOUR_PANTS: 9
WHEN_LYING_ON_THE_INVOLVED_SIDE: 3
PUTTING_ON_A_SHIRT_THAT_BUTTONS_DOWN_THE_FRONT: 7
PLEASE_INDICATE_YOR_PRIMARY_REASON_FOR_REFERRAL_TO_THERAPY:: SHOULDER
TOUCHING_THE_BACK_OF_YOUR_NECK: 6
WASHING_YOUR_BACK: 10
REMOVING_SOMETHING_FROM_YOUR_BACK_POCKET: 5
CARRYING_A_HEAVY_OBJECT_OF_10_POUNDS: 9
PUSHING_WITH_THE_INVOLVED_ARM: 3
AT_ITS_WORST?: 3
PUTTING_ON_AN_UNDERSHIRT_OR_A_PULLOVER_SWEATER: 7

## 2024-05-30 NOTE — TELEPHONE ENCOUNTER
Form completed for: Damien Rasmussen  What was done with form: faxed requested information to include 5/29/24 OV note ffrom Dr Green to Chad Casetllanos.       Moncho BURDICK

## 2024-05-30 NOTE — TELEPHONE ENCOUNTER
[facility] Call Center    Phone Message    May a detailed message be left on voicemail: yes     Reason for Call: Other: Work comp  is calling she says that the appt schedule for Monday is not approved as she has not gotten the  records as of yet. Fax number is [phone]     Action Taken: Other: te    Travel Screening: Not Applicable     Date of Service:

## 2024-05-31 ENCOUNTER — THERAPY VISIT (OUTPATIENT)
Dept: PHYSICAL THERAPY | Facility: CLINIC | Age: 64
End: 2024-05-31
Attending: ORTHOPAEDIC SURGERY
Payer: OTHER MISCELLANEOUS

## 2024-05-31 DIAGNOSIS — M25.511 ACUTE PAIN OF RIGHT SHOULDER: Primary | ICD-10-CM

## 2024-05-31 DIAGNOSIS — S43.004A DISLOCATION OF RIGHT SHOULDER JOINT, INITIAL ENCOUNTER: ICD-10-CM

## 2024-05-31 DIAGNOSIS — M75.121 COMPLETE TEAR OF RIGHT ROTATOR CUFF, UNSPECIFIED WHETHER TRAUMATIC: ICD-10-CM

## 2024-05-31 DIAGNOSIS — Z02.6 ENCOUNTER RELATED TO WORKER'S COMPENSATION CLAIM: ICD-10-CM

## 2024-05-31 DIAGNOSIS — S43.004A DISLOCATION OF RIGHT SHOULDER JOINT, INITIAL ENCOUNTER: Primary | ICD-10-CM

## 2024-05-31 PROBLEM — M25.519 SHOULDER PAIN: Status: ACTIVE | Noted: 2024-05-31

## 2024-05-31 PROCEDURE — 97161 PT EVAL LOW COMPLEX 20 MIN: CPT | Mod: GP | Performed by: PHYSICAL THERAPIST

## 2024-05-31 PROCEDURE — 97110 THERAPEUTIC EXERCISES: CPT | Mod: GP | Performed by: PHYSICAL THERAPIST

## 2024-05-31 NOTE — PROGRESS NOTES
CHIEF COMPLAINT: Right shoulder pain    DIAGNOSIS: Right shoulder massive rotator cuff tear after dislocation event    OCCUPATION/SPORT: Work comp -     HPI:   Damien Rasmussen is a very pleasant 63 year old, right-hand dominant male who presents for evaluation of right shoulder pain.  Symptoms started on 4/22/2024. There was a precipitating event where he was at work and pushing an item forward and up over his head. His feet slipped and he fell forward with his arm extended, landing on the arm and dislocated his shoulder.  The anterior shoulder dislocation was reduced in the ER. The pain is located to the posterior and lateral arm. Worst pain is rated a 4 of 10, and current pain is rated at 0 of 10. Symptoms are worsened by movement and usage. Symptoms are improved with stretching. Patient has tried physical therapy and a shoulder immobilizer with good relief. Associated symptoms include pain and weakness. Patient has pain radiating down the arm to the mid humerus, with no numbness. Notably, the patient has had no history of shoulder injuries or surgeries in the past.  This is his first time dislocating. No other concerns or complaints at this time. SANE score R - 20 L - 100.    PAST MEDICAL HISTORY:  Past Medical History:   Diagnosis Date    Anterior shoulder dislocation, right, sequela 04/22/2024    Encounter related to worker's compensation claim 04/22/2024       PAST SURGICAL HISTORY:  No past surgical history on file.    CURRENT MEDICATIONS:  Current Outpatient Medications   Medication Sig Dispense Refill    losartan (COZAAR) 50 MG tablet Take 50 mg by mouth daily      simvastatin (ZOCOR) 40 MG tablet Take 40 mg by mouth at bedtime Pt unsure of dose         ALLERGIES:    No Known Allergies      FAMILY HISTORY: No pertinent family history, reviewed in EMR.    SOCIAL HISTORY:   Social History     Socioeconomic History    Marital status:      Spouse name: Not on file    Number of children: Not  on file    Years of education: Not on file    Highest education level: Not on file   Occupational History    Not on file   Tobacco Use    Smoking status: Former     Types: Cigarettes    Smokeless tobacco: Never   Substance and Sexual Activity    Alcohol use: Not Currently    Drug use: Not Currently    Sexual activity: Not on file   Other Topics Concern    Not on file   Social History Narrative    Not on file     Social Determinants of Health     Financial Resource Strain: Not on file   Food Insecurity: Not on file   Transportation Needs: Not on file   Physical Activity: Not on file   Stress: Not on file   Social Connections: Not on file   Interpersonal Safety: Not on file   Housing Stability: Not on file       REVIEW OF SYSTEMS: Positive for that noted in past medical history and history of present illness and otherwise reviewed in EMR    PHYSICAL EXAM:  Patient is Data Unavailable and weighs 0 lbs 0 oz There were no vitals taken for this visit.  There is no height or weight on file to calculate BMI.   Constitutional: Well-developed, well-nourished, healthy appearing male.  Skin: Warm, dry   HEENT: Normal  Cardiac: Well perfused extremities, strong 2+ peripheral pulses. No edema.   Pulmonary: Breathing room air    Musculoskeletal:   Right Shoulder:  AROM right shoulder: 20/20/0/hip   AROM left shoulder: 160/160/60/T12   PROM right shoulder: 100/60/30/L5   3/5 supraspinatus, 3/5 infraspinatus, 4/5 subscapularis  no AC joint pain, negative cross body adduction  positive Neer and Burton impingement signs  Neurovascular exam and cervical spine exam are normal.    X-RAYS:     ADVANCED IMAGING: I personally reviewed the prior x-rays and MRI which shows that the humeral head is centered.  There is a massive tear involving the supraspinatus infraspinatus and subscapularis, there is grade 1 fatty atrophy.  There is subluxation of the long head of the biceps tendon.    IMPRESSION: 63 year old-year-old right hand dominant  male, with right shoulder massive rotator cuff tear after dislocation.     PLAN:     I discussed with the patient the etiology of their condition. We discussed at length the options as noted above.  I discussed with the patient the results of the MRI.  The patient has a massive rotator cuff tear after work-related dislocation event.  Given the large nature of the tear as well as the patient's active lifestyle working as a  and no antecedent pain I recommended surgical fixation.  The patient does not statically fixed and has no signs of rotator cuff arthropathy, there is very mild atrophy.  We discussed risk and benefits of surgery as well as alternatives.  I also discussed with the patient that he has developed significant stiffness, we discussed that the patient needs to go through extensive preoperative exercises and physical therapy to work on the stiffness, if this is not resolved before surgery we would have to delay surgery until his range of motion has improved passively.  We discussed this is because with surgical fixation he would be at high risk for postoperative stiffness without full passive range of motion beforehand.  We also discussed given the large nature of the tear the chance of a retear, not completely healing, residual pain, weakness, dysfunction.  We also discussed the use of potential allograft augmentation.  The patient wishes to proceed forward with surgery, is going to be scheduled for stat preoperative physical therapy at the [facility] [place], will need Worker's Compensation approval    After going over these options, Damien would like to proceed with right shoulder arthroscopy, subacromial decompression, rotator cuff repair and related procedures. We reviewed the risks and benefits of surgery including but not limited to the following: Risk of anesthesia, including death; infection; nerve/tendon/vessel injury; deep venous thrombosis (DVT), pulmonary embolism (PE); shoulder  stiffness, possible need to treat the biceps tendon including but not limited to tenotomy (cutting the tendon) or arthroscopic biceps tenodesis (securing the tendon into a bone socket in the humerus through an arthroscopic incision), possible need to address the acromioclavicular joint (AC joint); rotator cuff repair non-healing or re-tear; hardware- related problems; potential of rotator cuff repair irreparability (not able to repair the torn tendons), potential of the procedure to not alleviate the condition and continued pain; and the potential need for further surgery in the future. We also discussed the possible use of biologic augmentation with a collagen scaffold or an allograft dermal (skin) graft depending on the tissue quality, tear size, and intraoperative determination of healing potential.     After going over these risks, benefits and alternatives Damien would like to proceed with surgery. All of his questions were answered satisfactorily and he signed the surgical consent form to proceed. All appropriate paperwork was completed and he will work with our surgical scheduling department to coordinate the surgery.      At the conclusion of the office visit, Damien verbally acknowledged that I answered all of his questions satisfactorily.    Amalia Juan MD  Orthopedic Surgery Sports Medicine and Shoulder Surgery

## 2024-05-31 NOTE — PROGRESS NOTES
PHYSICAL THERAPY EVALUATION  Type of Visit: Evaluation    See electronic medical record for Abuse and Falls Screening details.    Subjective       Presenting condition or subjective complaint: Rotator cuff tear  Dislocated anterior at work 4/22, since has had MRI showing large RC tear. Painful at times. Can't lift arm. Unable to work at this time. Immobilizer x 3 weeks after injury.  Per MD note 5/29 w/ Dr Green will consult w/ Dr Marin for surgery. Goal improve ROM in meantime.   Damien reports he has been working on squeezing stress ball/working on  strength.   Right side dominant.  Date of onset: 04/22/24    Relevant medical history: High blood pressure; Overweight   Dates & types of surgery: none    Prior diagnostic imaging/testing results: MRI; X-ray     Prior therapy history for the same diagnosis, illness or injury: No      Prior Level of Function  Transfers:   Ambulation:   ADL:   IADL:     Living Environment  Social support: With a significant other or spouse   Type of home: House   Stairs to enter the home: Yes 9 Is there a railing: Yes   Ramp: No   Stairs inside the home: Yes 6 Is there a railing: Yes   Help at home: Self Cares (home health aide/personal care attendant, family, etc)  Equipment owned:       Employment: Yes   Hobbies/Interests:      Patient goals for therapy: extend my arm    Pain assessment: See objective evaluation for additional pain details     Objective   SHOULDER EVALUATION  PAIN: Pain Level at Rest: 2/10  Pain Level with Use: 8/10  Pain Location: shoulder  INTEGUMENTARY (edema, incisions): No significant swelling or bruising  Margarito deformity biceps  POSTURE: Standing Posture: Rounded shoulders, guarded  GAIT:   Weightbearing Status:   Assistive Device(s):   Gait Deviations:   BALANCE/PROPRIOCEPTION:   WEIGHTBEARING ALIGNMENT:   ROM:   (Degrees) Left AROM WFL all planes  And MMT grossly 5-/5 R MMT Right AROM  Right PROM   Shoulder Flexion  3/5 30    Shoulder  Extension       Shoulder Abduction  3/5 20    Shoulder Adduction       Shoulder Internal Rotation  4/5 R PSIS    Shoulder External Rotation  3-/5 10    Shoulder Horizontal Abduction       Shoulder Horizontal Adduction       Shoulder Flexion ER       Shoulder Flexion IR       Elbow Extension  5/5 WFL    Elbow Flexion  5-/5 WFL    Pain:   End feel:     STRENGTH: see above  FLEXIBILITY:   SPECIAL TESTS: NT ROM deficits  PALPATION:   JOINT MOBILITY:   CERVICAL SCREEN: WFL  Slight tightness noted in R UT, w/ AROM    Assessment & Plan   CLINICAL IMPRESSIONS  Medical Diagnosis: Dislocation of right shoulder joint, initial encounter (S43.004A)  - Primary    Complete tear of right rotator cuff, unspecified whether traumatic (M75.121)    Encounter related to worker's compensation claim (Z02.6)    Treatment Diagnosis: Acute right shoulder pain, impaired ROM   Impression/Assessment: Patient is a 63 year old male with right shoulder pain and weakness complaints.  The following significant findings have been identified: Pain, Decreased ROM/flexibility, Decreased joint mobility, Decreased strength, Impaired muscle performance, and Decreased activity tolerance. These impairments interfere with their ability to perform self care tasks, work tasks, recreational activities, household chores, household mobility, and community mobility as compared to previous level of function.     Clinical Decision Making (Complexity):  Clinical Presentation: Stable/Uncomplicated  Clinical Presentation Rationale: based on medical and personal factors listed in PT evaluation  Clinical Decision Making (Complexity): Low complexity    PLAN OF CARE  Treatment Interventions:  Modalities: Cryotherapy, Hot Pack, Ultrasound  Interventions: Manual Therapy, Neuromuscular Re-education, Therapeutic Activity, Therapeutic Exercise, Self-Care/Home Management    Long Term Goals     PT Goal 1  Goal Identifier: 1  Goal Description: Pt will demonstrate AROM flexion to 90*  flexion to improve ADLs  Rationale: to maximize safety and independence within the home;to maximize safety and independence with performance of ADLs and functional tasks;to maximize safety and independence with self cares  Target Date: 07/26/24  PT Goal 2  Goal Identifier: 2  Goal Description: Pt will demonstrate PROM > 120* flexion to maintain shoulder ROM and mobility  Rationale: to maximize safety and independence with performance of ADLs and functional tasks;to maximize safety and independence with self cares  Target Date: 07/26/24      Frequency of Treatment: 1x/week  Duration of Treatment: 8 weeks    Recommended Referrals to Other Professionals:   Education Assessment:        Risks and benefits of evaluation/treatment have been explained.   Patient/Family/caregiver agrees with Plan of Care.     Evaluation Time:     PT Eval, Low Complexity Minutes (65882): 10       Signing Clinician: Rin Nash PT

## 2024-06-03 ENCOUNTER — PRE VISIT (OUTPATIENT)
Dept: ORTHOPEDICS | Facility: CLINIC | Age: 64
End: 2024-06-03

## 2024-06-03 ENCOUNTER — TELEPHONE (OUTPATIENT)
Dept: ORTHOPEDICS | Facility: CLINIC | Age: 64
End: 2024-06-03
Payer: COMMERCIAL

## 2024-06-03 ENCOUNTER — MEDICAL CORRESPONDENCE (OUTPATIENT)
Dept: ORTHOPEDICS | Facility: CLINIC | Age: 64
End: 2024-06-03

## 2024-06-03 ENCOUNTER — OFFICE VISIT (OUTPATIENT)
Dept: ORTHOPEDICS | Facility: CLINIC | Age: 64
End: 2024-06-03
Payer: OTHER MISCELLANEOUS

## 2024-06-03 ENCOUNTER — DOCUMENTATION ONLY (OUTPATIENT)
Dept: ORTHOPEDICS | Facility: CLINIC | Age: 64
End: 2024-06-03

## 2024-06-03 ENCOUNTER — ANCILLARY PROCEDURE (OUTPATIENT)
Dept: GENERAL RADIOLOGY | Facility: CLINIC | Age: 64
End: 2024-06-03
Attending: ORTHOPAEDIC SURGERY
Payer: OTHER MISCELLANEOUS

## 2024-06-03 DIAGNOSIS — S43.004A DISLOCATION OF RIGHT SHOULDER JOINT, INITIAL ENCOUNTER: ICD-10-CM

## 2024-06-03 DIAGNOSIS — Z02.6 ENCOUNTER RELATED TO WORKER'S COMPENSATION CLAIM: ICD-10-CM

## 2024-06-03 DIAGNOSIS — M75.121 COMPLETE TEAR OF RIGHT ROTATOR CUFF, UNSPECIFIED WHETHER TRAUMATIC: ICD-10-CM

## 2024-06-03 PROCEDURE — 73030 X-RAY EXAM OF SHOULDER: CPT | Mod: RT | Performed by: RADIOLOGY

## 2024-06-03 PROCEDURE — 99204 OFFICE O/P NEW MOD 45 MIN: CPT | Performed by: ORTHOPAEDIC SURGERY

## 2024-06-03 NOTE — LETTER
Eve 3, 2024     Seen today: Yes    Patient:  Damien Rasmussen  :   1960  MRN:     [id number]  Physician: ED MCNAMARA    Damien Rasmussen may not return to work due to his right shoulder injury.  Surgical date is TB.    Please call the clinic with any questions,      Electronically signed by ED MCNAMARA MD

## 2024-06-03 NOTE — LETTER
6/3/2024         RE: Damien Rasmussen  [address] [address]        Dear Colleague,    Thank you for referring your patient, Damien Rasmussen, to the [facility] [place]. Please see a copy of my visit note below.    CHIEF COMPLAINT: Right shoulder pain    DIAGNOSIS: Right shoulder massive rotator cuff tear after dislocation event    OCCUPATION/SPORT: Work comp -     HPI:   Damien Rasmussen is a very pleasant 63 year old, right-hand dominant male who presents for evaluation of right shoulder pain.  Symptoms started on 4/22/2024. There was a precipitating event where he was at work and pushing an item forward and up over his head. His feet slipped and he fell forward with his arm extended, landing on the arm and dislocated his shoulder.  The anterior shoulder dislocation was reduced in the ER. The pain is located to the posterior and lateral arm. Worst pain is rated a 4 of 10, and current pain is rated at 0 of 10. Symptoms are worsened by movement and usage. Symptoms are improved with stretching. Patient has tried physical therapy and a shoulder immobilizer with good relief. Associated symptoms include pain and weakness. Patient has pain radiating down the arm to the mid humerus, with no numbness. Notably, the patient has had no history of shoulder injuries or surgeries in the past.  This is his first time dislocating. No other concerns or complaints at this time. SANE score R - 20 L - 100.    PAST MEDICAL HISTORY:  Past Medical History:   Diagnosis Date    Anterior shoulder dislocation, right, sequela 04/22/2024    Encounter related to worker's compensation claim 04/22/2024       PAST SURGICAL HISTORY:  No past surgical history on file.    CURRENT MEDICATIONS:  Current Outpatient Medications   Medication Sig Dispense Refill    losartan (COZAAR) 50 MG tablet Take 50 mg by mouth daily      simvastatin (ZOCOR) 40 MG tablet Take 40 mg by mouth at bedtime Pt unsure  of dose         ALLERGIES:    No Known Allergies      FAMILY HISTORY: No pertinent family history, reviewed in EMR.    SOCIAL HISTORY:   Social History     Socioeconomic History    Marital status:      Spouse name: Not on file    Number of children: Not on file    Years of education: Not on file    Highest education level: Not on file   Occupational History    Not on file   Tobacco Use    Smoking status: Former     Types: Cigarettes    Smokeless tobacco: Never   Substance and Sexual Activity    Alcohol use: Not Currently    Drug use: Not Currently    Sexual activity: Not on file   Other Topics Concern    Not on file   Social History Narrative    Not on file     Social Determinants of Health     Financial Resource Strain: Not on file   Food Insecurity: Not on file   Transportation Needs: Not on file   Physical Activity: Not on file   Stress: Not on file   Social Connections: Not on file   Interpersonal Safety: Not on file   Housing Stability: Not on file       REVIEW OF SYSTEMS: Positive for that noted in past medical history and history of present illness and otherwise reviewed in EMR    PHYSICAL EXAM:  Patient is Data Unavailable and weighs 0 lbs 0 oz There were no vitals taken for this visit.  There is no height or weight on file to calculate BMI.   Constitutional: Well-developed, well-nourished, healthy appearing male.  Skin: Warm, dry   HEENT: Normal  Cardiac: Well perfused extremities, strong 2+ peripheral pulses. No edema.   Pulmonary: Breathing room air    Musculoskeletal:   Right Shoulder:  AROM right shoulder: 20/20/0/hip   AROM left shoulder: 160/160/60/T12   PROM right shoulder: 100/60/30/L5   3/5 supraspinatus, 3/5 infraspinatus, 4/5 subscapularis  no AC joint pain, negative cross body adduction  positive Neer and Burton impingement signs  Neurovascular exam and cervical spine exam are normal.    X-RAYS:     ADVANCED IMAGING: I personally reviewed the prior x-rays and MRI which shows that the  humeral head is centered.  There is a massive tear involving the supraspinatus infraspinatus and subscapularis, there is grade 1 fatty atrophy.  There is subluxation of the long head of the biceps tendon.    IMPRESSION: 63 year old-year-old right hand dominant male, with right shoulder massive rotator cuff tear after dislocation.     PLAN:     I discussed with the patient the etiology of their condition. We discussed at length the options as noted above.  I discussed with the patient the results of the MRI.  The patient has a massive rotator cuff tear after work-related dislocation event.  Given the large nature of the tear as well as the patient's active lifestyle working as a  and no antecedent pain I recommended surgical fixation.  The patient does not statically fixed and has no signs of rotator cuff arthropathy, there is very mild atrophy.  We discussed risk and benefits of surgery as well as alternatives.  I also discussed with the patient that he has developed significant stiffness, we discussed that the patient needs to go through extensive preoperative exercises and physical therapy to work on the stiffness, if this is not resolved before surgery we would have to delay surgery until his range of motion has improved passively.  We discussed this is because with surgical fixation he would be at high risk for postoperative stiffness without full passive range of motion beforehand.  We also discussed given the large nature of the tear the chance of a retear, not completely healing, residual pain, weakness, dysfunction.  We also discussed the use of potential allograft augmentation.  The patient wishes to proceed forward with surgery, is going to be scheduled for stat preoperative physical therapy at the [facility], will need Worker's Compensation approval    After going over these options, Damien would like to proceed with right shoulder arthroscopy, subacromial decompression, rotator cuff  repair and related procedures. We reviewed the risks and benefits of surgery including but not limited to the following: Risk of anesthesia, including death; infection; nerve/tendon/vessel injury; deep venous thrombosis (DVT), pulmonary embolism (PE); shoulder stiffness, possible need to treat the biceps tendon including but not limited to tenotomy (cutting the tendon) or arthroscopic biceps tenodesis (securing the tendon into a bone socket in the humerus through an arthroscopic incision), possible need to address the acromioclavicular joint (AC joint); rotator cuff repair non-healing or re-tear; hardware- related problems; potential of rotator cuff repair irreparability (not able to repair the torn tendons), potential of the procedure to not alleviate the condition and continued pain; and the potential need for further surgery in the future. We also discussed the possible use of biologic augmentation with a collagen scaffold or an allograft dermal (skin) graft depending on the tissue quality, tear size, and intraoperative determination of healing potential.     After going over these risks, benefits and alternatives Damein would like to proceed with surgery. All of his questions were answered satisfactorily and he signed the surgical consent form to proceed. All appropriate paperwork was completed and he will work with our surgical scheduling department to coordinate the surgery.      At the conclusion of the office visit, Damien verbally acknowledged that I answered all of his questions satisfactorily.    Amalia Juan MD  Orthopedic Surgery Sports Medicine and Shoulder Surgery

## 2024-06-03 NOTE — PROGRESS NOTES
Form completed for: Damien Rasmussen  What was done with form: faced/conformed to attn: Jasmin GIBBS @ [facility] [phone]  W/C claim # [id number] wc [id number]   Moncho BURDICK

## 2024-06-03 NOTE — TELEPHONE ENCOUNTER
Date Scheduled: 6-25-24  Facility: Surgery Locations: [facility]  Surgeon: Dr. Juan   Post-op appointment scheduled: 7-1 & 8-5   scheduled?: No  Surgery packet/instructions confirmed received?  No  Pre op physical/PAC appointment: [facility]  Special Considerations:     Work Comp- message sent to prior auth team.    Lizzette Chanel  Surgery Scheduling Coordinator  Ph: [phone]

## 2024-06-03 NOTE — TELEPHONE ENCOUNTER
Message left for the patient to call back to get surgery scheduled with [place]. Juan [place] [facility] [place] doing PT sessions.     Lizzette Chanel  Surgery Scheduling Coordinator  Ph: [phone]

## 2024-06-04 NOTE — TELEPHONE ENCOUNTER
"Called Pt to discuss pre-op packet, post op expectations, made appt on 6/20 with Dr. Juan for preop ROM check. Made PAC appt for tomorrow afternoon virtual.     Pre-Operative Teaching Flowsheet     Person(s) involved in teaching: Patient     Motivation Level:  Receptive (willing/able to accept information) and asks appropriate questions where applicable: Yes  Any cultural factors/Caodaism beliefs that may influence understanding or compliance? No     Patient demonstrates understanding of the following:  Pre-operative planning, including the necessary appointments and preparation needed prior to surgery: Yes  Which situations necessitate calling provider and whom to contact: Yes  Pain management techniques pre and post op: Yes  Stoplight tool introduced, questions answered, patient expressed understanding: Yes  How, and when, to access community resources: Yes  Discussed appropriate and safe discharge to home: Yes  Patient has a designated  for surgery and \"\" to stay with them after: Yes    Additional Teaching Concerns Addressed:  Post-operative living arrangements and necessary adaptations to living environment.  Instructional Materials Used/Given: Yes, pre-op packet given to patient with additional system forms added as needed depending on type of surgery. Pre-op soap given (if in clinic).     Time spent with patient: 20 minutes.    Kaleb Gaming RNCC  "

## 2024-06-04 NOTE — TELEPHONE ENCOUNTER
FUTURE VISIT INFORMATION      SURGERY INFORMATION:  Date: 6/25/24  Location:  or  Surgeon:  Amalia Juan MD   Anesthesia Type:  General with block  Procedure: RIGHT ARTHROSCOPY, SHOULDER, WITH MASSIVE  ROTATOR CUFF REPAIR, possible allograft augmentation, possible biceps tenodesis versus tenotomy, subacromial space decompression   Consult: ov 6/3/24    RECORDS REQUESTED FROM:       Primary Care Provider: [facility]

## 2024-06-05 ENCOUNTER — VIRTUAL VISIT (OUTPATIENT)
Dept: SURGERY | Facility: CLINIC | Age: 64
End: 2024-06-05
Payer: OTHER MISCELLANEOUS

## 2024-06-05 ENCOUNTER — ANESTHESIA EVENT (OUTPATIENT)
Dept: SURGERY | Facility: AMBULATORY SURGERY CENTER | Age: 64
End: 2024-06-05
Payer: OTHER MISCELLANEOUS

## 2024-06-05 ENCOUNTER — PRE VISIT (OUTPATIENT)
Dept: SURGERY | Facility: CLINIC | Age: 64
End: 2024-06-05

## 2024-06-05 VITALS — WEIGHT: 210 LBS | BODY MASS INDEX: 29.4 KG/M2 | HEIGHT: 71 IN

## 2024-06-05 DIAGNOSIS — Z01.818 PRE-OP EVALUATION: Primary | ICD-10-CM

## 2024-06-05 DIAGNOSIS — M75.121 COMPLETE TEAR OF RIGHT ROTATOR CUFF, UNSPECIFIED WHETHER TRAUMATIC: ICD-10-CM

## 2024-06-05 PROCEDURE — 99202 OFFICE O/P NEW SF 15 MIN: CPT | Mod: 95 | Performed by: PHYSICIAN ASSISTANT

## 2024-06-05 RX ORDER — HYDROCORTISONE ACETATE 0.5 %
1 CREAM (GRAM) TOPICAL 2 TIMES DAILY
COMMUNITY

## 2024-06-05 RX ORDER — MULTIVITAMIN
1 TABLET ORAL EVERY EVENING
COMMUNITY

## 2024-06-05 ASSESSMENT — LIFESTYLE VARIABLES: TOBACCO_USE: 1

## 2024-06-05 ASSESSMENT — PAIN SCALES - GENERAL: PAINLEVEL: MILD PAIN (2)

## 2024-06-05 NOTE — PATIENT INSTRUCTIONS
Preparing for Your Surgery      Name:  Damien Rasmussen   MRN:  [id number]   :  1960   Today's Date:  2024         Arriving for surgery:  Surgery date:  2024  Arrival time:  6:30 am    Restrictions due to COVID 19:    Please maintain social distance.  Masking is optional.      parking is available for anyone with mobility limitations or disabilities. (Monday- Friday 7 am- 5 pm)    Please come to:    [facility]  [address] [place] [id number]    Please check in on the 5th floor at the Ambulatory Surgery Center.      What can I eat or drink?    -  You may eat and drink normally until 8 hours prior to arrival  time. (Until 24 at 11 pm)  -  You may have clear liquids until 2 hours prior to arrival  time. (Until 4:30 am)    Examples of clear liquids:  Water  Clear broth  Juices (apple, white grape, white cranberry  and cider) without pulp  Noncarbonated, powder based beverages  (lemonade and Jacek-Aid)  Sodas (Sprite, 7-Up, ginger ale and seltzer)  Coffee or tea (without milk or cream)  Gatorade      Which medicines can I take?    Hold Aspirin for 7 days before surgery.   Hold Multivitamins for 7 days before surgery.  Hold Supplements for 7 days before surgery. (Glucosamine)  Hold Ibuprofen (Advil, Motrin) for 1 day before surgery--unless otherwise directed by surgeon.  Hold Naproxen (Aleve) for 4 days before surgery.    No alcohol or cannabis products for 24 hours prior to procedure.      -  DO NOT take the following medications the day of surgery:  Losartan       -  PLEASE TAKE the following medications the day of surgery:   Omeprazole       How do I prepare myself?  - Please take 2 showers (one the night prior to surgery and one the morning of surgery) using Scrubcare or Hibiclens soap.    Use this soap only from the neck to your toes.     Leave the soap on your skin for one minute--then rinse thoroughly.      You may use your own shampoo and conditioner.  No other hair products.   - Please remove all jewelry and body piercings.  - No lotions, deodorants or fragrance.  - No makeup or fingernail polish.   - Bring your ID and insurance card.    -If you have a Deep Brain Stimulator, a Spinal Cord Stimulator, or any implanted Neuro Device, you must bring the remote to the Surgery Center.         ALL PATIENTS ARE REQUIRED TO HAVE A RESPONSIBLE ADULT TO DRIVE AND BE IN ATTENDANCE WITH THEM FOR 24 HOURS FOLLOWING SURGERY.     Covid testing policy as of 12/06/2022  Your surgeon will notify and schedule you for a COVID test if one is needed before surgery--please direct any questions or COVID symptoms to your surgeon      Questions or Concerns:    -For questions regarding the day of surgery, please contact the Ambulatory Surgery Center at [phone].    -If you have health changes between today and your surgery, please contact your surgeon.     - For questions after surgery, please contact your surgeon's office.

## 2024-06-05 NOTE — H&P
Pre-Operative H & P     CC:  Preoperative exam to assess for increased cardiopulmonary risk while undergoing surgery and anesthesia.    Date of Encounter: 6/5/2024  Primary Care Physician:  No Ref-Primary, Physician     Reason for visit:   Encounter Diagnoses   Name Primary?    Pre-op evaluation Yes    Complete tear of right rotator cuff, unspecified whether traumatic        HPI  Damien Rasmussen is a 63 year old male who presents for pre-operative H & P in preparation for  Procedure Information       Case: [id number] Date/Time: 06/25/24 0800    Procedure: RIGHT ARTHROSCOPY, SHOULDER, WITH MASSIVE  ROTATOR CUFF REPAIR, possible allograft augmentation, possible biceps tenodesis versus tenotomy, subacromial space decompression (Right: Shoulder)    Anesthesia type: General with Block    Diagnosis: Complete tear of right rotator cuff, unspecified whether traumatic [M75.121]    Pre-op diagnosis: Complete tear of right rotator cuff, unspecified whether traumatic [M75.121]    Location: [place] [address] / [facility]    Providers: Amalia Juan MD            Patient is being evaluated for comorbid conditions of HTN, HLD, GERD, prediabetes.    He has a history of right shoulder pain that started in April after a precipitating event where he was at work and pushing an item forward and up over his head. His feet slipped and he fell forward with his arm extended, landing on the arm and dislocated his shoulder.  The dislocation was reduced in the ER but he has had persistent pain. He was evaluated by Dr. Juan on 6/3/24 where he was diagnosed with a right shoulder massive rotator cuff tear. He is now scheduled for surgery as above for further management.     History is obtained from the patient and chart review    Hx of abnormal bleeding or anti-platelet use: Denies      Past Medical History  Past Medical History:   Diagnosis Date    Anterior shoulder dislocation, right, sequela 04/22/2024     Encounter related to worker's compensation claim 04/22/2024    GERD (gastroesophageal reflux disease)     HLD (hyperlipidemia)     HTN (hypertension)        Past Surgical History  Past Surgical History:   Procedure Laterality Date    COLONOSCOPY         Prior to Admission Medications  Current Outpatient Medications   Medication Sig Dispense Refill    losartan (COZAAR) 50 MG tablet Take 50 mg by mouth every morning      multivitamin w/minerals (MULTI-VITAMIN) tablet Take 1 tablet by mouth every evening      omeprazole (PRILOSEC) 20 MG DR capsule Take 20 mg by mouth daily (with lunch)      simvastatin (ZOCOR) 40 MG tablet Take 40 mg by mouth at bedtime Pt unsure of dose      Glucosamine-Chondroit-Vit C-Mn (GLUCOSAMINE CHONDROITIN 1500 COMPLEX) CAPS Take 1 tablet by mouth 2 times daily         Allergies  No Known Allergies    Social History  Social History     Socioeconomic History    Marital status:      Spouse name: Not on file    Number of children: Not on file    Years of education: Not on file    Highest education level: Not on file   Occupational History    Not on file   Tobacco Use    Smoking status: Former     Types: Cigarettes    Smokeless tobacco: Never   Substance and Sexual Activity    Alcohol use: Not Currently    Drug use: Never    Sexual activity: Not on file   Other Topics Concern    Not on file   Social History Narrative    Not on file     Social Determinants of Health     Financial Resource Strain: Not on file   Food Insecurity: Not on file   Transportation Needs: Not on file   Physical Activity: Not on file   Stress: Not on file   Social Connections: Not on file   Interpersonal Safety: Not on file   Housing Stability: Not on file       Family History  Family History   Problem Relation Age of Onset    Anesthesia Reaction No family hx of     Venous thrombosis No family hx of        Review of Systems  The complete review of systems is negative other than noted in the HPI or here.   Anesthesia  Evaluation   Pt has had prior anesthetic. Type: MAC.    No history of anesthetic complications       ROS/MED HX  ENT/Pulmonary:     (+)     SHEBA risk factors,  hypertension,         tobacco use, Past use,                    (-) asthma and recent URI   Neurologic:  - neg neurologic ROS     Cardiovascular: Comment: Denies chest pain/pressure, OSBORN, orthopnea, dizziness/syncope, palpitations, edema.     (+) Dyslipidemia hypertension- -   -  - -                                      METS/Exercise Tolerance: >4 METS    Hematologic:  - neg hematologic  ROS     Musculoskeletal: Comment: Hx right anterior shoulder dislocation  Right rotator cuff tear      GI/Hepatic:     (+) GERD, Asymptomatic on medication,               (-) liver disease   Renal/Genitourinary:  - neg Renal ROS     Endo: Comment: Prediabetes, A1c 6.4 on 11/29/23      Psychiatric/Substance Use:  - neg psychiatric ROS     Infectious Disease:  - neg infectious disease ROS     Malignancy:  - neg malignancy ROS     Other:            Virtual visit -  No vitals were obtained    Physical Exam  Constitutional: No apparent distress and appears stated age.  Eyes: Pupils equal  HENT: Normocephalic  Respiratory: non labored breathing   Neurologic: Awake, alert, oriented to name, place and time.   Neuropsychiatric: Calm, cooperative. Normal affect.      Prior Labs/Diagnostic Studies   All labs and imaging personally reviewed     EKG/ stress test - if available please see in ROS above   No results found.    The patient's records and results personally reviewed by this provider.     Outside records reviewed from: Care Everywhere      Assessment  Damien Rasmussen is a 63 year old male seen as a PAC referral for risk assessment and optimization for anesthesia.    Plan/Recommendations  Pt will be optimized for the proposed procedure.  See below for details on the assessment, risk, and preoperative recommendations    NEUROLOGY  - No history of TIA, CVA or seizure    -Post Op  "delirium risk factors:  No risk identified    ENT  - No current airway concerns.  Will need to be reassessed day of surgery.  Mallampati: Unable to assess  TM: Unable to assess    CARDIAC  - No history of CAD and Afib  - Hypertension  Well controlled. Hold losartan day of surgery.  - Hyperlipidemia  Well controlled on home regimen  - METS (Metabolic Equivalents)  Patient performs 4 or more METS exercise without symptoms             Total Score: 0      RCRI-Very low risk: Class 1 0.4% complication rate             Total Score: 0        PULMONARY    SHEBA Medium Risk             Total Score: 3    SHEBA: Hypertension    SHEBA: Over 50 ys old    SHEBA: Male      - Denies asthma or inhaler use  - Tobacco History    History   Smoking Status    Former    Types: Cigarettes   Smokeless Tobacco    Never       GI  - GERD  Controlled on medications: Proton Pump Inhibitor  PONV Medium Risk  Total Score: 2           1 AN PONV: Patient is not a current smoker    1 AN PONV: Intended Post Op Opioids        /RENAL  - Baseline Creatinine  1.10    ENDOCRINE    - BMI: Estimated body mass index is 29.29 kg/m  as calculated from the following:    Height as of this encounter: 1.803 m (5' 11\").    Weight as of this encounter: 95.3 kg (210 lb).  Overweight (BMI 25.0-29.9)  - No history of Diabetes Mellitus    HEME  VTE Low Risk 0.5%             Total Score: 3    VTE: Greater than 59 yrs old    VTE: Male      - No history of abnormal bleeding or antiplatelet use.      MSK  - Right shoulder massive rotator cuff tear after dislocation  Above surgery planned for further management.    Patient is NOT Frail             Total Score: 1    Frailty: Decrease in strength        Different anesthesia methods/types have been discussed with the patient, but they are aware that the final plan will be decided by the assigned anesthesia provider on the date of service.    The patient is optimized for their procedure. AVS with information on surgery time/arrival " time, meds and NPO status given by nursing staff. No further diagnostic testing indicated.    Please refer to the physical examination documented by the anesthesiologist in the anesthesia record on the day of surgery.    Video-Visit Details    Type of service:  Video Visit    Provider received verbal consent for a Video Visit from the patient? Yes     Originating Location (pt. Location): Home    Distant Location (provider location):  Off-site  Mode of Communication:  Video Conference via [employer]  On the day of service:     Prep time: 10 minutes  Visit time: 10 minutes  Documentation time: 5 minutes  ------------------------------------------  Total time: 25 minutes      Judy García PA-C  Preoperative Assessment Center  [facility]  Phone: [phone]  Fax: [phone]

## 2024-06-06 ENCOUNTER — THERAPY VISIT (OUTPATIENT)
Dept: PHYSICAL THERAPY | Facility: CLINIC | Age: 64
End: 2024-06-06
Attending: ORTHOPAEDIC SURGERY
Payer: OTHER MISCELLANEOUS

## 2024-06-06 DIAGNOSIS — M75.121 COMPLETE TEAR OF RIGHT ROTATOR CUFF, UNSPECIFIED WHETHER TRAUMATIC: ICD-10-CM

## 2024-06-06 DIAGNOSIS — M25.519 SHOULDER PAIN: Primary | ICD-10-CM

## 2024-06-06 DIAGNOSIS — S43.004A DISLOCATION OF RIGHT SHOULDER JOINT, INITIAL ENCOUNTER: ICD-10-CM

## 2024-06-06 DIAGNOSIS — Z02.6 ENCOUNTER RELATED TO WORKER'S COMPENSATION CLAIM: ICD-10-CM

## 2024-06-06 PROCEDURE — 97110 THERAPEUTIC EXERCISES: CPT | Mod: GP | Performed by: PHYSICAL THERAPIST

## 2024-06-06 PROCEDURE — 97112 NEUROMUSCULAR REEDUCATION: CPT | Mod: GP | Performed by: PHYSICAL THERAPIST

## 2024-06-10 NOTE — PROGRESS NOTES
CHIEF COMPLAINT: Right shoulder pain    DIAGNOSIS: Right shoulder massive rotator cuff tear after dislocation event    OCCUPATION/SPORT: Work comp -     HPI:   Damien Rasmussen is a very pleasant 63 year old, right-hand dominant male who presents for evaluation of right shoulder pain. Feels range of motion has improved. SANE score R - 60 L - 100.    PAST MEDICAL HISTORY:  Past Medical History:   Diagnosis Date    Anterior shoulder dislocation, right, sequela 04/22/2024    Encounter related to worker's compensation claim 04/22/2024    GERD (gastroesophageal reflux disease)     HLD (hyperlipidemia)     HTN (hypertension)        PAST SURGICAL HISTORY:  Past Surgical History:   Procedure Laterality Date    COLONOSCOPY         CURRENT MEDICATIONS:  Current Outpatient Medications   Medication Sig Dispense Refill    Glucosamine-Chondroit-Vit C-Mn (GLUCOSAMINE CHONDROITIN 1500 COMPLEX) CAPS Take 1 tablet by mouth 2 times daily      losartan (COZAAR) 50 MG tablet Take 50 mg by mouth every morning      multivitamin w/minerals (MULTI-VITAMIN) tablet Take 1 tablet by mouth every evening      omeprazole (PRILOSEC) 20 MG DR capsule Take 20 mg by mouth daily (with lunch)      simvastatin (ZOCOR) 40 MG tablet Take 40 mg by mouth at bedtime Pt unsure of dose         ALLERGIES:    No Known Allergies      FAMILY HISTORY: No pertinent family history, reviewed in EMR.    SOCIAL HISTORY:   Social History     Socioeconomic History    Marital status:      Spouse name: Not on file    Number of children: Not on file    Years of education: Not on file    Highest education level: Not on file   Occupational History    Not on file   Tobacco Use    Smoking status: Former     Types: Cigarettes    Smokeless tobacco: Never   Substance and Sexual Activity    Alcohol use: Not Currently    Drug use: Never    Sexual activity: Not on file   Other Topics Concern    Not on file   Social History Narrative    Not on file     Social  Determinants of Health     Financial Resource Strain: Not on file   Food Insecurity: Not on file   Transportation Needs: Not on file   Physical Activity: Not on file   Stress: Not on file   Social Connections: Not on file   Interpersonal Safety: Not on file   Housing Stability: Not on file       REVIEW OF SYSTEMS: Positive for that noted in past medical history and history of present illness and otherwise reviewed in EMR    PHYSICAL EXAM:  Patient is Data Unavailable and weighs 0 lbs 0 oz There were no vitals taken for this visit.  There is no height or weight on file to calculate BMI.   Constitutional: Well-developed, well-nourished, healthy appearing male.  Skin: Warm, dry   HEENT: Normal  Cardiac: Well perfused extremities, strong 2+ peripheral pulses. No edema.   Pulmonary: Breathing room air    Musculoskeletal:   Right Shoulder:  AROM right shoulder: 30/20/0/hip   AROM left shoulder: 160/160/60/T12   PROM right shoulder: 100/60/30/L5   Supine I can get the patient's passive range of motion of 240 degrees, external rotation of 60 degrees, abduction to 90 degrees  3/5 supraspinatus, 3/5 infraspinatus, 4/5 subscapularis  no AC joint pain, negative cross body adduction  positive Neer and Burton impingement signs  Neurovascular exam and cervical spine exam are normal.    X-RAYS:     ADVANCED IMAGING: I personally reviewed the prior x-rays and MRI which shows that the humeral head is centered.  There is a massive tear involving the supraspinatus infraspinatus and subscapularis, there is grade 1 fatty atrophy.  There is subluxation of the long head of the biceps tendon.    IMPRESSION: 63 year old-year-old right hand dominant male, with right shoulder massive rotator cuff tear after dislocation.     PLAN:     I discussed with the patient the etiology of their condition. We discussed at length the options as noted above.  I discussed with the patient the results of the MRI.  The patient has a massive rotator cuff tear  after work-related dislocation event.  Given the large nature of the tear as well as the patient's active lifestyle working as a  and no antecedent pain I recommended surgical fixation.  The patient does not statically fixed and has no signs of rotator cuff arthropathy, there is very mild atrophy.  The patient's stiffness is improved however not completely regained.  I talked to one of our skilled therapist Meghan today who is going to try to do a manipulation.  Patient also has therapy scheduled for Friday.  We talked about the risk of proceeding including if the patient has significant stiffness may need to cancel surgery on Tuesday, the risks of postoperative stiffness.  The patient understands and wishes to proceed forward with surgery on Tuesday.  I will assess him in the preop area again.      After going over these options, Damien would like to proceed with right shoulder arthroscopy, subacromial decompression, rotator cuff repair and related procedures. We reviewed the risks and benefits of surgery including but not limited to the following: Risk of anesthesia, including death; infection; nerve/tendon/vessel injury; deep venous thrombosis (DVT), pulmonary embolism (PE); shoulder stiffness, possible need to treat the biceps tendon including but not limited to tenotomy (cutting the tendon) or arthroscopic biceps tenodesis (securing the tendon into a bone socket in the humerus through an arthroscopic incision), possible need to address the acromioclavicular joint (AC joint); rotator cuff repair non-healing or re-tear; hardware- related problems; potential of rotator cuff repair irreparability (not able to repair the torn tendons), potential of the procedure to not alleviate the condition and continued pain; and the potential need for further surgery in the future. We also discussed the possible use of biologic augmentation with a collagen scaffold or an allograft dermal (skin) graft depending on  the tissue quality, tear size, and intraoperative determination of healing potential.     After going over these risks, benefits and alternatives Damien would like to proceed with surgery. All of his questions were answered satisfactorily and he signed the surgical consent form to proceed. All appropriate paperwork was completed and he will work with our surgical scheduling department to coordinate the surgery.      At the conclusion of the office visit, Damien verbally acknowledged that I answered all of his questions satisfactorily.    Amalia Juan MD  Orthopedic Surgery Sports Medicine and Shoulder Surgery

## 2024-06-13 ENCOUNTER — THERAPY VISIT (OUTPATIENT)
Dept: PHYSICAL THERAPY | Facility: CLINIC | Age: 64
End: 2024-06-13
Attending: ORTHOPAEDIC SURGERY
Payer: OTHER MISCELLANEOUS

## 2024-06-13 DIAGNOSIS — M25.519 SHOULDER PAIN: Primary | ICD-10-CM

## 2024-06-13 PROCEDURE — 97112 NEUROMUSCULAR REEDUCATION: CPT | Mod: GP | Performed by: PHYSICAL THERAPIST

## 2024-06-13 PROCEDURE — 97110 THERAPEUTIC EXERCISES: CPT | Mod: GP | Performed by: PHYSICAL THERAPIST

## 2024-06-20 ENCOUNTER — OFFICE VISIT (OUTPATIENT)
Dept: ORTHOPEDICS | Facility: CLINIC | Age: 64
End: 2024-06-20
Payer: OTHER MISCELLANEOUS

## 2024-06-20 ENCOUNTER — THERAPY VISIT (OUTPATIENT)
Dept: PHYSICAL THERAPY | Facility: CLINIC | Age: 64
End: 2024-06-20
Payer: OTHER MISCELLANEOUS

## 2024-06-20 DIAGNOSIS — M25.519 SHOULDER PAIN: Primary | ICD-10-CM

## 2024-06-20 DIAGNOSIS — M75.121 COMPLETE TEAR OF RIGHT ROTATOR CUFF, UNSPECIFIED WHETHER TRAUMATIC: Primary | ICD-10-CM

## 2024-06-20 PROCEDURE — 97110 THERAPEUTIC EXERCISES: CPT | Mod: GP | Performed by: PHYSICAL THERAPIST

## 2024-06-20 PROCEDURE — 97140 MANUAL THERAPY 1/> REGIONS: CPT | Mod: GP | Performed by: PHYSICAL THERAPIST

## 2024-06-20 PROCEDURE — 99214 OFFICE O/P EST MOD 30 MIN: CPT | Performed by: ORTHOPAEDIC SURGERY

## 2024-06-20 NOTE — LETTER
6/20/2024      Damien Rasmussen  [address] [facility] [address]      Dear Colleague,    Thank you for referring your patient, Damien Rasmussen, to the [facility]. Please see a copy of my visit note below.    CHIEF COMPLAINT: Right shoulder pain    DIAGNOSIS: Right shoulder massive rotator cuff tear after dislocation event    OCCUPATION/SPORT: Work comp -     HPI:   Damien Rasmussen is a very pleasant 63 year old, right-hand dominant male who presents for evaluation of right shoulder pain. Feels range of motion has improved. SANE score R - 60 L - 100.    PAST MEDICAL HISTORY:  Past Medical History:   Diagnosis Date     Anterior shoulder dislocation, right, sequela 04/22/2024     Encounter related to worker's compensation claim 04/22/2024     GERD (gastroesophageal reflux disease)      HLD (hyperlipidemia)      HTN (hypertension)        PAST SURGICAL HISTORY:  Past Surgical History:   Procedure Laterality Date     COLONOSCOPY         CURRENT MEDICATIONS:  Current Outpatient Medications   Medication Sig Dispense Refill     Glucosamine-Chondroit-Vit C-Mn (GLUCOSAMINE CHONDROITIN 1500 COMPLEX) CAPS Take 1 tablet by mouth 2 times daily       losartan (COZAAR) 50 MG tablet Take 50 mg by mouth every morning       multivitamin w/minerals (MULTI-VITAMIN) tablet Take 1 tablet by mouth every evening       omeprazole (PRILOSEC) 20 MG DR capsule Take 20 mg by mouth daily (with lunch)       simvastatin (ZOCOR) 40 MG tablet Take 40 mg by mouth at bedtime Pt unsure of dose         ALLERGIES:    No Known Allergies      FAMILY HISTORY: No pertinent family history, reviewed in EMR.    SOCIAL HISTORY:   Social History     Socioeconomic History     Marital status:      Spouse name: Not on file     Number of children: Not on file     Years of education: Not on file     Highest education level: Not on file   Occupational History     Not on file   Tobacco Use     Smoking status: Former      Types: Cigarettes     Smokeless tobacco: Never   Substance and Sexual Activity     Alcohol use: Not Currently     Drug use: Never     Sexual activity: Not on file   Other Topics Concern     Not on file   Social History Narrative     Not on file     Social Determinants of Health     Financial Resource Strain: Not on file   Food Insecurity: Not on file   Transportation Needs: Not on file   Physical Activity: Not on file   Stress: Not on file   Social Connections: Not on file   Interpersonal Safety: Not on file   Housing Stability: Not on file       REVIEW OF SYSTEMS: Positive for that noted in past medical history and history of present illness and otherwise reviewed in EMR    PHYSICAL EXAM:  Patient is Data Unavailable and weighs 0 lbs 0 oz There were no vitals taken for this visit.  There is no height or weight on file to calculate BMI.   Constitutional: Well-developed, well-nourished, healthy appearing male.  Skin: Warm, dry   HEENT: Normal  Cardiac: Well perfused extremities, strong 2+ peripheral pulses. No edema.   Pulmonary: Breathing room air    Musculoskeletal:   Right Shoulder:  AROM right shoulder: 30/20/0/hip   AROM left shoulder: 160/160/60/T12   PROM right shoulder: 100/60/30/L5   Supine I can get the patient's passive range of motion of 240 degrees, external rotation of 60 degrees, abduction to 90 degrees  3/5 supraspinatus, 3/5 infraspinatus, 4/5 subscapularis  no AC joint pain, negative cross body adduction  positive Neer and Burton impingement signs  Neurovascular exam and cervical spine exam are normal.    X-RAYS:     ADVANCED IMAGING: I personally reviewed the prior x-rays and MRI which shows that the humeral head is centered.  There is a massive tear involving the supraspinatus infraspinatus and subscapularis, there is grade 1 fatty atrophy.  There is subluxation of the long head of the biceps tendon.    IMPRESSION: 63 year old-year-old right hand dominant male, with right shoulder massive rotator  cuff tear after dislocation.     PLAN:     I discussed with the patient the etiology of their condition. We discussed at length the options as noted above.  I discussed with the patient the results of the MRI.  The patient has a massive rotator cuff tear after work-related dislocation event.  Given the large nature of the tear as well as the patient's active lifestyle working as a  and no antecedent pain I recommended surgical fixation.  The patient does not statically fixed and has no signs of rotator cuff arthropathy, there is very mild atrophy.  The patient's stiffness is improved however not completely regained.  I talked to one of our skilled therapist Meghan today who is going to try to do a manipulation.  Patient also has therapy scheduled for Friday.  We talked about the risk of proceeding including if the patient has significant stiffness may need to cancel surgery on Tuesday, the risks of postoperative stiffness.  The patient understands and wishes to proceed forward with surgery on Tuesday.  I will assess him in the preop area again.      After going over these options, Damien would like to proceed with right shoulder arthroscopy, subacromial decompression, rotator cuff repair and related procedures. We reviewed the risks and benefits of surgery including but not limited to the following: Risk of anesthesia, including death; infection; nerve/tendon/vessel injury; deep venous thrombosis (DVT), pulmonary embolism (PE); shoulder stiffness, possible need to treat the biceps tendon including but not limited to tenotomy (cutting the tendon) or arthroscopic biceps tenodesis (securing the tendon into a bone socket in the humerus through an arthroscopic incision), possible need to address the acromioclavicular joint (AC joint); rotator cuff repair non-healing or re-tear; hardware- related problems; potential of rotator cuff repair irreparability (not able to repair the torn tendons), potential of  the procedure to not alleviate the condition and continued pain; and the potential need for further surgery in the future. We also discussed the possible use of biologic augmentation with a collagen scaffold or an allograft dermal (skin) graft depending on the tissue quality, tear size, and intraoperative determination of healing potential.     After going over these risks, benefits and alternatives Damien would like to proceed with surgery. All of his questions were answered satisfactorily and he signed the surgical consent form to proceed. All appropriate paperwork was completed and he will work with our surgical scheduling department to coordinate the surgery.      At the conclusion of the office visit, Damien verbally acknowledged that I answered all of his questions satisfactorily.    Amalia Mcnamara MD  Orthopedic Surgery Sports Medicine and Shoulder Surgery      Again, thank you for allowing me to participate in the care of your patient.        Sincerely,        AMALIA MCNAMARA MD

## 2024-06-21 ENCOUNTER — THERAPY VISIT (OUTPATIENT)
Dept: PHYSICAL THERAPY | Facility: CLINIC | Age: 64
End: 2024-06-21
Payer: OTHER MISCELLANEOUS

## 2024-06-21 ENCOUNTER — TELEPHONE (OUTPATIENT)
Dept: ORTHOPEDICS | Facility: CLINIC | Age: 64
End: 2024-06-21
Payer: COMMERCIAL

## 2024-06-21 DIAGNOSIS — M25.519 SHOULDER PAIN: Primary | ICD-10-CM

## 2024-06-21 PROCEDURE — 97110 THERAPEUTIC EXERCISES: CPT | Mod: GP | Performed by: PHYSICAL THERAPIST

## 2024-06-21 PROCEDURE — 97140 MANUAL THERAPY 1/> REGIONS: CPT | Mod: GP | Performed by: PHYSICAL THERAPIST

## 2024-06-21 NOTE — TELEPHONE ENCOUNTER
Other: pt called requesting for medication, pain meds for after surgery. Please send to [facility].      Could we send this information to you in [place] or would you prefer to receive a phone call?:   Patient would prefer a phone call   Okay to leave a detailed message?: Yes at Home number on file [phone] (home)

## 2024-06-21 NOTE — PROGRESS NOTES
06/21/24 0500   Appointment Info   Signing clinician's name / credentials Livan Asuma, DPT, SCS   Total/Authorized Visits 8   Visits Used 5   Medical Diagnosis Dislocation of right shoulder joint, initial encounter (S43.004A)  - Primary    Complete tear of right rotator cuff, unspecified whether traumatic (M75.121)    Encounter related to worker's compensation claim (Z02.6)   PT Tx Diagnosis Acute right shoulder pain, impaired ROM   Progress Note/Certification   Onset of illness/injury or Date of Surgery 04/22/24   Therapy Frequency 1x/week   Predicted Duration 8 weeks   PT Goal 1   Goal Identifier 1   Goal Description Pt will demonstrate AROM flexion to 90* flexion to improve ADLs   Rationale to maximize safety and independence within the home;to maximize safety and independence with performance of ADLs and functional tasks;to maximize safety and independence with self cares   Goal Progress continues to demonstrate significant limitations, having  surgery on 6/25   Target Date 07/26/24   PT Goal 2   Goal Identifier 2   Goal Description Pt will demonstrate PROM > 120* flexion to maintain shoulder ROM and mobility   Rationale to maximize safety and independence with performance of ADLs and functional tasks;to maximize safety and independence with self cares   Target Date 07/26/24   Date Met 06/13/24   Subjective Report   Subjective Report Damien reports his stretches are going well and he feels like there is more motion after manual therapy yesterday.  He is scheduled for shoulder surgery on 6/25/24.   Objective Measures   Objective Measures Objective Measure 1   Objective Measure 1   Objective Measure AAROM   Details Extension 75 Abduction 140  inc to 127 Flexion 16o in supine ER 65   Treatment Interventions (PT)   Interventions Therapeutic Procedure/Exercise;Neuromuscular Re-education;Manual Therapy   Therapeutic Procedure/Exercise   Therapeutic Procedures: strength, endurance, ROM, flexibility minutes (45133) 15    PTRx Ther Proc 2 Wand Shoulder Extension Standing   PTRx Ther Proc 2 - Details 10x    PTRx Ther Proc 4 Wand Shoulder Flexion Supine   PTRx Ther Proc 4 - Details 10   PTRx Ther Proc 5 Wand Shoulder External Rotation at 90 degrees Abduction   PTRx Ther Proc 5 - Details 10x   PTRx Ther Proc 6 Side-lying Posterior Capsule Stretch   PTRx Ther Proc 6 - Details x5   PTRx Ther Proc 7 Wall Climb   PTRx Ther Proc 7 - Details 10x   Patient Response/Progress Understood end-raange strain improtant to assist in ROM and remodeling   Manual Therapy   Manual Therapy: Mobilization, MFR, MLD, friction massage minutes (33912) 20   Manual Therapy 1 Supine; Shoulder efflaurage with mobilization   Manual Therapy 1 - Details Post Grade II; Inmferio at 80 degrees Grade II   Patient Response/Progress tolerated well with increased PROM and AAROM after   Plan   Home program online ptrx set up   Updates to plan of care d/c from pre-op PT   Plan for next session initiate post-op PT when ordered to do so   Total Session Time   Timed Code Treatment Minutes 35   Total Treatment Time (sum of timed and untimed services) 35         DISCHARGE  Reason for Discharge: Patient is scheduled for surgery on 6/25 for the current treatment problem.  He is discharged from pre-op PT and will start post-op PT with a new evaluation, goals, and plan of care when directed by his surgeon.    Equipment Issued: none    Discharge Plan: Patient to continue home program until surgery.    Referring Provider:  Thiago Green

## 2024-06-21 NOTE — PROGRESS NOTES
CHIEF COMPLAINT: Status post   1. Right shoulder arthroscopic massive rotator cuff repair with allograft augmentation.   2. Right shoulder arthroscopic glenohumeral debridement, extensive  3. Right shoulder subacromial bursectomy   4. Right shoulder arthroscopic biceps tenotomy  5.  Right shoulder manipulation under anesthesia     DATE OF SURGERY: 6/25/24    HISTORY OF PRESENT ILLNESS: Damien is an extremely pleasant 63 year-old right-hand dominant male who is 1 week status post the above procedure.  He reports minimal pain that comes and goes.  SANE R - 0 L - 100    EXAM:  Pleasant adult 63 year old in no distress.  Respirations even and unlabored.  Right upper extremity: Incisions clean, dry, and intact. No erythema. No drainage. Sens intact Ax/Musc/Med/Rad/Uln nerves. Motor intact EPL, FPL, and Intrinsics. Shoulder range of motion not tested due to postop restrictions.      ASSESSMENT:  1. 1 week status post above procedure    PLAN:  I reviewed the intraop findings.  We discussed the plan for rehabilitation with delaying until 6 weeks due to the massive nature of the tear as well as need for allograft augmentation.  I discussed need to keep the sling on at all times except for bathing and dressing or home exercises.  Patient was encouraged to perform active range of motion exercises about the hand wrist and elbow.  We discussed a step down regimen for pain medication to over the counter medications. The patient will start physical therapy.  I will see the patient back in 5 weeks.      Amalia Juan  Orthopedic Surgery Sports Medicine and Shoulder Surgery

## 2024-06-21 NOTE — TELEPHONE ENCOUNTER
Called Pt back to discuss that we do not prescribe medication prior to surgery, but that he would be able to  the medication at the pharmacy at the [facility] on the day of surgery so there is no delay. All questions answered at this time.    Kaleb Gaming, RNCC

## 2024-06-24 RX ORDER — FENTANYL CITRATE 50 UG/ML
25 INJECTION, SOLUTION INTRAMUSCULAR; INTRAVENOUS
Status: CANCELLED | OUTPATIENT
Start: 2024-06-24

## 2024-06-24 RX ORDER — NALOXONE HYDROCHLORIDE 0.4 MG/ML
0.1 INJECTION, SOLUTION INTRAMUSCULAR; INTRAVENOUS; SUBCUTANEOUS
Status: CANCELLED | OUTPATIENT
Start: 2024-06-24

## 2024-06-24 RX ORDER — DEXAMETHASONE SODIUM PHOSPHATE 10 MG/ML
4 INJECTION, SOLUTION INTRAMUSCULAR; INTRAVENOUS
Status: CANCELLED | OUTPATIENT
Start: 2024-06-24

## 2024-06-24 RX ORDER — OXYCODONE HYDROCHLORIDE 5 MG/1
10 TABLET ORAL
Status: CANCELLED | OUTPATIENT
Start: 2024-06-24

## 2024-06-24 RX ORDER — ACETAMINOPHEN 325 MG/1
975 TABLET ORAL ONCE
Status: CANCELLED | OUTPATIENT
Start: 2024-06-24 | End: 2024-06-24

## 2024-06-24 RX ORDER — ONDANSETRON 4 MG/1
4 TABLET, ORALLY DISINTEGRATING ORAL EVERY 30 MIN PRN
Status: CANCELLED | OUTPATIENT
Start: 2024-06-24

## 2024-06-24 RX ORDER — ONDANSETRON 2 MG/ML
4 INJECTION INTRAMUSCULAR; INTRAVENOUS EVERY 30 MIN PRN
Status: CANCELLED | OUTPATIENT
Start: 2024-06-24

## 2024-06-24 ASSESSMENT — LIFESTYLE VARIABLES: TOBACCO_USE: 1

## 2024-06-24 NOTE — ANESTHESIA PREPROCEDURE EVALUATION
Anesthesia Pre-Procedure Evaluation    Patient: Damien Rasmussen   MRN: [id number] : 1960        Procedure : Procedure(s):  RIGHT ARTHROSCOPY, SHOULDER, WITH MASSIVE  ROTATOR CUFF REPAIR, possible allograft augmentation, possible biceps tenodesis versus tenotomy, subacromial space decompression          Past Medical History:   Diagnosis Date    Anterior shoulder dislocation, right, sequela 2024    Encounter related to worker's compensation claim 2024    GERD (gastroesophageal reflux disease)     HLD (hyperlipidemia)     HTN (hypertension)       Past Surgical History:   Procedure Laterality Date    COLONOSCOPY        No Known Allergies   Social History     Tobacco Use    Smoking status: Former     Types: Cigarettes    Smokeless tobacco: Never   Substance Use Topics    Alcohol use: Not Currently      Wt Readings from Last 1 Encounters:   24 95.3 kg (210 lb)        Anesthesia Evaluation   Pt has had prior anesthetic. Type: MAC.    No history of anesthetic complications       ROS/MED HX  ENT/Pulmonary:     (+)     SHEBA risk factors,  hypertension,         tobacco use, Past use,                    (-) asthma and recent URI   Neurologic:  - neg neurologic ROS     Cardiovascular: Comment: Denies chest pain/pressure, OSBORN, orthopnea, dizziness/syncope, palpitations, edema.     (+) Dyslipidemia hypertension- -   -  - -                                      METS/Exercise Tolerance: >4 METS    Hematologic:  - neg hematologic  ROS     Musculoskeletal: Comment: Hx right anterior shoulder dislocation  Right rotator cuff tear      GI/Hepatic:     (+) GERD, Asymptomatic on medication,               (-) liver disease   Renal/Genitourinary:  - neg Renal ROS     Endo: Comment: Prediabetes, A1c 6.4 on 23      Psychiatric/Substance Use:  - neg psychiatric ROS     Infectious Disease:  - neg infectious disease ROS     Malignancy:  - neg malignancy ROS     Other:               OUTSIDE LABS:  CBC: No results found  "for: \"WBC\", \"HGB\", \"HCT\", \"PLT\"  BMP: No results found for: \"NA\", \"POTASSIUM\", \"CHLORIDE\", \"CO2\", \"BUN\", \"CR\", \"GLC\"  COAGS: No results found for: \"PTT\", \"INR\", \"FIBR\"  POC: No results found for: \"BGM\", \"HCG\", \"HCGS\"  HEPATIC: No results found for: \"ALBUMIN\", \"PROTTOTAL\", \"ALT\", \"AST\", \"GGT\", \"ALKPHOS\", \"BILITOTAL\", \"BILIDIRECT\", \"JEREMIAS\"  OTHER: No results found for: \"PH\", \"LACT\", \"A1C\", \"RAVI\", \"PHOS\", \"MAG\", \"LIPASE\", \"AMYLASE\", \"TSH\", \"T4\", \"T3\", \"CRP\", \"SED\"    Anesthesia Plan    ASA Status:  2       Anesthesia Type: General.     - Airway: LMA   Induction: Intravenous, Propofol.   Maintenance: Balanced.        Consents    Anesthesia Plan(s) and associated risks, benefits, and realistic alternatives discussed. Questions answered and patient/representative(s) expressed understanding.     - Discussed: Risks, Benefits and Alternatives for BOTH SEDATION and the PROCEDURE were discussed     - Discussed with:       - Extended Intubation/Ventilatory Support Discussed: No.      - Patient is DNR/DNI Status: No     Use of blood products discussed: No .     Postoperative Care    Pain management: IV analgesics, Oral pain medications, Multi-modal analgesia, Peripheral nerve block (Single Shot).   PONV prophylaxis: Dexamethasone or Solumedrol, Ondansetron (or other 5HT-3), Background Propofol Infusion     Comments:               Pantera Cabrera MD    I have reviewed the pertinent notes and labs in the chart from the past 30 days and (re)examined the patient.  Any updates or changes from those notes are reflected in this note.              # Overweight: Estimated body mass index is 29.29 kg/m  as calculated from the following:    Height as of 6/5/24: 1.803 m (5' 11\").    Weight as of 6/5/24: 95.3 kg (210 lb).      "

## 2024-06-25 ENCOUNTER — HOSPITAL ENCOUNTER (OUTPATIENT)
Facility: AMBULATORY SURGERY CENTER | Age: 64
Discharge: HOME | End: 2024-06-25
Attending: ORTHOPAEDIC SURGERY
Payer: OTHER MISCELLANEOUS

## 2024-06-25 ENCOUNTER — SURGERY (OUTPATIENT)
Age: 64
End: 2024-06-25
Payer: OTHER MISCELLANEOUS

## 2024-06-25 ENCOUNTER — ANESTHESIA (OUTPATIENT)
Dept: SURGERY | Facility: AMBULATORY SURGERY CENTER | Age: 64
End: 2024-06-25
Payer: OTHER MISCELLANEOUS

## 2024-06-25 VITALS
DIASTOLIC BLOOD PRESSURE: 89 MMHG | HEART RATE: 80 BPM | OXYGEN SATURATION: 95 % | TEMPERATURE: 96.8 F | RESPIRATION RATE: 13 BRPM | SYSTOLIC BLOOD PRESSURE: 139 MMHG

## 2024-06-25 DIAGNOSIS — Z98.890 S/P SHOULDER SURGERY: ICD-10-CM

## 2024-06-25 DIAGNOSIS — M75.121 COMPLETE TEAR OF RIGHT ROTATOR CUFF, UNSPECIFIED WHETHER TRAUMATIC: Primary | ICD-10-CM

## 2024-06-25 PROCEDURE — 29827 SHO ARTHRS SRG RT8TR CUF RPR: CPT | Performed by: NURSE ANESTHETIST, CERTIFIED REGISTERED

## 2024-06-25 PROCEDURE — 29823 SHO ARTHRS SRG XTNSV DBRDMT: CPT | Mod: RT

## 2024-06-25 PROCEDURE — 29827 SHO ARTHRS SRG RT8TR CUF RPR: CPT | Mod: 22 | Performed by: ORTHOPAEDIC SURGERY

## 2024-06-25 PROCEDURE — 29827 SHO ARTHRS SRG RT8TR CUF RPR: CPT | Mod: RT

## 2024-06-25 PROCEDURE — 29999 UNLISTED PX ARTHROSCOPY: CPT | Mod: GC | Performed by: ORTHOPAEDIC SURGERY

## 2024-06-25 PROCEDURE — 29999 UNLISTED PX ARTHROSCOPY: CPT | Mod: RT

## 2024-06-25 PROCEDURE — 29823 SHO ARTHRS SRG XTNSV DBRDMT: CPT | Mod: GC | Performed by: ORTHOPAEDIC SURGERY

## 2024-06-25 PROCEDURE — C9290 INJ, BUPIVACAINE LIPOSOME: HCPCS

## 2024-06-25 PROCEDURE — 64415 NJX AA&/STRD BRCH PLXS IMG: CPT | Mod: 59 | Performed by: STUDENT IN AN ORGANIZED HEALTH CARE EDUCATION/TRAINING PROGRAM

## 2024-06-25 PROCEDURE — 29827 SHO ARTHRS SRG RT8TR CUF RPR: CPT | Performed by: STUDENT IN AN ORGANIZED HEALTH CARE EDUCATION/TRAINING PROGRAM

## 2024-06-25 DEVICE — BIO-COMPSI CRKSCRW FT 4.5X 14MM
Type: IMPLANTABLE DEVICE | Site: SHOULDER | Status: FUNCTIONAL
Brand: ARTHREX®

## 2024-06-25 RX ORDER — BUPIVACAINE HYDROCHLORIDE 5 MG/ML
INJECTION, SOLUTION EPIDURAL; INTRACAUDAL
Status: COMPLETED | OUTPATIENT
Start: 2024-06-25 | End: 2024-06-25

## 2024-06-25 RX ORDER — SODIUM CHLORIDE, SODIUM LACTATE, POTASSIUM CHLORIDE, CALCIUM CHLORIDE 600; 310; 30; 20 MG/100ML; MG/100ML; MG/100ML; MG/100ML
INJECTION, SOLUTION INTRAVENOUS CONTINUOUS
Status: DISCONTINUED | OUTPATIENT
Start: 2024-06-25 | End: 2024-06-26 | Stop reason: HOSPADM

## 2024-06-25 RX ORDER — PROPOFOL 10 MG/ML
INJECTION, EMULSION INTRAVENOUS PRN
Status: DISCONTINUED | OUTPATIENT
Start: 2024-06-25 | End: 2024-06-25

## 2024-06-25 RX ORDER — GABAPENTIN 100 MG/1
100 CAPSULE ORAL 3 TIMES DAILY
Qty: 21 CAPSULE | Refills: 0 | Status: SHIPPED | OUTPATIENT
Start: 2024-06-25

## 2024-06-25 RX ORDER — NALOXONE HYDROCHLORIDE 0.4 MG/ML
0.4 INJECTION, SOLUTION INTRAMUSCULAR; INTRAVENOUS; SUBCUTANEOUS
Status: DISCONTINUED | OUTPATIENT
Start: 2024-06-25 | End: 2024-06-25 | Stop reason: HOSPADM

## 2024-06-25 RX ORDER — ONDANSETRON 2 MG/ML
4 INJECTION INTRAMUSCULAR; INTRAVENOUS EVERY 30 MIN PRN
Status: DISCONTINUED | OUTPATIENT
Start: 2024-06-25 | End: 2024-06-26 | Stop reason: HOSPADM

## 2024-06-25 RX ORDER — HYDRALAZINE HYDROCHLORIDE 20 MG/ML
2.5-5 INJECTION INTRAMUSCULAR; INTRAVENOUS EVERY 10 MIN PRN
Status: DISCONTINUED | OUTPATIENT
Start: 2024-06-25 | End: 2024-06-26 | Stop reason: HOSPADM

## 2024-06-25 RX ORDER — ALBUTEROL SULFATE 0.83 MG/ML
2.5 SOLUTION RESPIRATORY (INHALATION) EVERY 4 HOURS PRN
Status: DISCONTINUED | OUTPATIENT
Start: 2024-06-25 | End: 2024-06-26 | Stop reason: HOSPADM

## 2024-06-25 RX ORDER — CEFAZOLIN SODIUM 2 G/50ML
2 SOLUTION INTRAVENOUS SEE ADMIN INSTRUCTIONS
Status: DISCONTINUED | OUTPATIENT
Start: 2024-06-25 | End: 2024-06-25 | Stop reason: HOSPADM

## 2024-06-25 RX ORDER — LIDOCAINE 40 MG/G
CREAM TOPICAL
Status: DISCONTINUED | OUTPATIENT
Start: 2024-06-25 | End: 2024-06-25 | Stop reason: HOSPADM

## 2024-06-25 RX ORDER — LABETALOL HYDROCHLORIDE 5 MG/ML
10 INJECTION, SOLUTION INTRAVENOUS
Status: DISCONTINUED | OUTPATIENT
Start: 2024-06-25 | End: 2024-06-26 | Stop reason: HOSPADM

## 2024-06-25 RX ORDER — DOCUSATE SODIUM 100 MG/1
100 CAPSULE, LIQUID FILLED ORAL 2 TIMES DAILY
Qty: 30 CAPSULE | Refills: 0 | Status: SHIPPED | OUTPATIENT
Start: 2024-06-25

## 2024-06-25 RX ORDER — CEFAZOLIN SODIUM 2 G/50ML
2 SOLUTION INTRAVENOUS
Status: COMPLETED | OUTPATIENT
Start: 2024-06-25 | End: 2024-06-25

## 2024-06-25 RX ORDER — HYDROMORPHONE HYDROCHLORIDE 1 MG/ML
0.4 INJECTION, SOLUTION INTRAMUSCULAR; INTRAVENOUS; SUBCUTANEOUS EVERY 5 MIN PRN
Status: DISCONTINUED | OUTPATIENT
Start: 2024-06-25 | End: 2024-06-26 | Stop reason: HOSPADM

## 2024-06-25 RX ORDER — ACETAMINOPHEN 325 MG/1
975 TABLET ORAL ONCE
Status: COMPLETED | OUTPATIENT
Start: 2024-06-25 | End: 2024-06-25

## 2024-06-25 RX ORDER — ACETAMINOPHEN 325 MG/1
975 TABLET ORAL ONCE
Status: DISCONTINUED | OUTPATIENT
Start: 2024-06-25 | End: 2024-06-26 | Stop reason: HOSPADM

## 2024-06-25 RX ORDER — LIDOCAINE HYDROCHLORIDE 20 MG/ML
INJECTION, SOLUTION INFILTRATION; PERINEURAL PRN
Status: DISCONTINUED | OUTPATIENT
Start: 2024-06-25 | End: 2024-06-25

## 2024-06-25 RX ORDER — DIPHENHYDRAMINE HCL 12.5 MG/5ML
12.5 SOLUTION ORAL EVERY 6 HOURS PRN
Status: DISCONTINUED | OUTPATIENT
Start: 2024-06-25 | End: 2024-06-26 | Stop reason: HOSPADM

## 2024-06-25 RX ORDER — NALOXONE HYDROCHLORIDE 0.4 MG/ML
0.2 INJECTION, SOLUTION INTRAMUSCULAR; INTRAVENOUS; SUBCUTANEOUS
Status: DISCONTINUED | OUTPATIENT
Start: 2024-06-25 | End: 2024-06-25 | Stop reason: HOSPADM

## 2024-06-25 RX ORDER — HYDROCODONE BITARTRATE AND ACETAMINOPHEN 5; 325 MG/1; MG/1
1 TABLET ORAL EVERY 6 HOURS PRN
Qty: 10 TABLET | Refills: 0 | Status: SHIPPED | OUTPATIENT
Start: 2024-06-25 | End: 2024-06-28

## 2024-06-25 RX ORDER — FENTANYL CITRATE 50 UG/ML
INJECTION, SOLUTION INTRAMUSCULAR; INTRAVENOUS PRN
Status: DISCONTINUED | OUTPATIENT
Start: 2024-06-25 | End: 2024-06-25

## 2024-06-25 RX ORDER — FENTANYL CITRATE 50 UG/ML
50 INJECTION, SOLUTION INTRAMUSCULAR; INTRAVENOUS EVERY 5 MIN PRN
Status: DISCONTINUED | OUTPATIENT
Start: 2024-06-25 | End: 2024-06-26 | Stop reason: HOSPADM

## 2024-06-25 RX ORDER — ONDANSETRON 2 MG/ML
INJECTION INTRAMUSCULAR; INTRAVENOUS PRN
Status: DISCONTINUED | OUTPATIENT
Start: 2024-06-25 | End: 2024-06-25

## 2024-06-25 RX ORDER — CELECOXIB 100 MG/1
100 CAPSULE ORAL 2 TIMES DAILY
Qty: 30 CAPSULE | Refills: 0 | Status: SHIPPED | OUTPATIENT
Start: 2024-06-25

## 2024-06-25 RX ORDER — DIPHENHYDRAMINE HYDROCHLORIDE 50 MG/ML
12.5 INJECTION INTRAMUSCULAR; INTRAVENOUS EVERY 6 HOURS PRN
Status: DISCONTINUED | OUTPATIENT
Start: 2024-06-25 | End: 2024-06-26 | Stop reason: HOSPADM

## 2024-06-25 RX ORDER — GLYCOPYRROLATE 0.2 MG/ML
INJECTION, SOLUTION INTRAMUSCULAR; INTRAVENOUS PRN
Status: DISCONTINUED | OUTPATIENT
Start: 2024-06-25 | End: 2024-06-25

## 2024-06-25 RX ORDER — SODIUM CHLORIDE, SODIUM LACTATE, POTASSIUM CHLORIDE, CALCIUM CHLORIDE 600; 310; 30; 20 MG/100ML; MG/100ML; MG/100ML; MG/100ML
INJECTION, SOLUTION INTRAVENOUS CONTINUOUS
Status: DISCONTINUED | OUTPATIENT
Start: 2024-06-25 | End: 2024-06-25 | Stop reason: HOSPADM

## 2024-06-25 RX ORDER — NALOXONE HYDROCHLORIDE 0.4 MG/ML
0.1 INJECTION, SOLUTION INTRAMUSCULAR; INTRAVENOUS; SUBCUTANEOUS
Status: DISCONTINUED | OUTPATIENT
Start: 2024-06-25 | End: 2024-06-26 | Stop reason: HOSPADM

## 2024-06-25 RX ORDER — OXYCODONE HYDROCHLORIDE 5 MG/1
5 TABLET ORAL
Status: DISCONTINUED | OUTPATIENT
Start: 2024-06-25 | End: 2024-06-26 | Stop reason: HOSPADM

## 2024-06-25 RX ORDER — DEXAMETHASONE SODIUM PHOSPHATE 10 MG/ML
4 INJECTION, SOLUTION INTRAMUSCULAR; INTRAVENOUS
Status: DISCONTINUED | OUTPATIENT
Start: 2024-06-25 | End: 2024-06-26 | Stop reason: HOSPADM

## 2024-06-25 RX ORDER — MEPERIDINE HYDROCHLORIDE 25 MG/ML
12.5 INJECTION INTRAMUSCULAR; INTRAVENOUS; SUBCUTANEOUS EVERY 5 MIN PRN
Status: DISCONTINUED | OUTPATIENT
Start: 2024-06-25 | End: 2024-06-26 | Stop reason: HOSPADM

## 2024-06-25 RX ORDER — KETOROLAC TROMETHAMINE 30 MG/ML
15 INJECTION, SOLUTION INTRAMUSCULAR; INTRAVENOUS
Status: DISCONTINUED | OUTPATIENT
Start: 2024-06-25 | End: 2024-06-26 | Stop reason: HOSPADM

## 2024-06-25 RX ORDER — ONDANSETRON 4 MG/1
4 TABLET, ORALLY DISINTEGRATING ORAL EVERY 30 MIN PRN
Status: DISCONTINUED | OUTPATIENT
Start: 2024-06-25 | End: 2024-06-26 | Stop reason: HOSPADM

## 2024-06-25 RX ORDER — PROPOFOL 10 MG/ML
INJECTION, EMULSION INTRAVENOUS CONTINUOUS PRN
Status: DISCONTINUED | OUTPATIENT
Start: 2024-06-25 | End: 2024-06-25

## 2024-06-25 RX ORDER — FENTANYL CITRATE 50 UG/ML
25-50 INJECTION, SOLUTION INTRAMUSCULAR; INTRAVENOUS
Status: DISCONTINUED | OUTPATIENT
Start: 2024-06-25 | End: 2024-06-25 | Stop reason: HOSPADM

## 2024-06-25 RX ORDER — ONDANSETRON 4 MG/1
4 TABLET, ORALLY DISINTEGRATING ORAL EVERY 8 HOURS PRN
Qty: 10 TABLET | Refills: 0 | Status: SHIPPED | OUTPATIENT
Start: 2024-06-25

## 2024-06-25 RX ORDER — FLUMAZENIL 0.1 MG/ML
0.2 INJECTION, SOLUTION INTRAVENOUS
Status: DISCONTINUED | OUTPATIENT
Start: 2024-06-25 | End: 2024-06-25 | Stop reason: HOSPADM

## 2024-06-25 RX ORDER — FENTANYL CITRATE 50 UG/ML
25 INJECTION, SOLUTION INTRAMUSCULAR; INTRAVENOUS EVERY 5 MIN PRN
Status: DISCONTINUED | OUTPATIENT
Start: 2024-06-25 | End: 2024-06-26 | Stop reason: HOSPADM

## 2024-06-25 RX ORDER — DEXAMETHASONE SODIUM PHOSPHATE 4 MG/ML
INJECTION, SOLUTION INTRA-ARTICULAR; INTRALESIONAL; INTRAMUSCULAR; INTRAVENOUS; SOFT TISSUE PRN
Status: DISCONTINUED | OUTPATIENT
Start: 2024-06-25 | End: 2024-06-25

## 2024-06-25 RX ORDER — HYDROMORPHONE HYDROCHLORIDE 1 MG/ML
0.2 INJECTION, SOLUTION INTRAMUSCULAR; INTRAVENOUS; SUBCUTANEOUS EVERY 5 MIN PRN
Status: DISCONTINUED | OUTPATIENT
Start: 2024-06-25 | End: 2024-06-26 | Stop reason: HOSPADM

## 2024-06-25 RX ORDER — LORAZEPAM 2 MG/ML
.5-1 INJECTION INTRAMUSCULAR
Status: DISCONTINUED | OUTPATIENT
Start: 2024-06-25 | End: 2024-06-26 | Stop reason: HOSPADM

## 2024-06-25 RX ADMIN — ONDANSETRON 4 MG: 2 INJECTION INTRAMUSCULAR; INTRAVENOUS at 09:41

## 2024-06-25 RX ADMIN — PROPOFOL 175 MCG/KG/MIN: 10 INJECTION, EMULSION INTRAVENOUS at 10:47

## 2024-06-25 RX ADMIN — GLYCOPYRROLATE 0.2 MG: 0.2 INJECTION, SOLUTION INTRAMUSCULAR; INTRAVENOUS at 08:17

## 2024-06-25 RX ADMIN — FENTANYL CITRATE 50 MCG: 50 INJECTION, SOLUTION INTRAMUSCULAR; INTRAVENOUS at 08:05

## 2024-06-25 RX ADMIN — Medication 100 MCG: at 08:33

## 2024-06-25 RX ADMIN — Medication 100 MCG: at 08:13

## 2024-06-25 RX ADMIN — PROPOFOL 175 MCG/KG/MIN: 10 INJECTION, EMULSION INTRAVENOUS at 10:25

## 2024-06-25 RX ADMIN — PROPOFOL 200 MCG/KG/MIN: 10 INJECTION, EMULSION INTRAVENOUS at 08:05

## 2024-06-25 RX ADMIN — BUPIVACAINE HYDROCHLORIDE 15 ML: 5 INJECTION, SOLUTION EPIDURAL; INTRACAUDAL at 07:40

## 2024-06-25 RX ADMIN — FENTANYL CITRATE 50 MCG: 50 INJECTION, SOLUTION INTRAMUSCULAR; INTRAVENOUS at 07:37

## 2024-06-25 RX ADMIN — Medication 100 MCG: at 08:26

## 2024-06-25 RX ADMIN — CEFAZOLIN SODIUM 2 G: 2 SOLUTION INTRAVENOUS at 08:10

## 2024-06-25 RX ADMIN — PROPOFOL 150 MCG/KG/MIN: 10 INJECTION, EMULSION INTRAVENOUS at 09:26

## 2024-06-25 RX ADMIN — Medication 30 ML: at 10:58

## 2024-06-25 RX ADMIN — FENTANYL CITRATE 50 MCG: 50 INJECTION, SOLUTION INTRAMUSCULAR; INTRAVENOUS at 09:46

## 2024-06-25 RX ADMIN — DEXAMETHASONE SODIUM PHOSPHATE 4 MG: 4 INJECTION, SOLUTION INTRA-ARTICULAR; INTRALESIONAL; INTRAMUSCULAR; INTRAVENOUS; SOFT TISSUE at 08:17

## 2024-06-25 RX ADMIN — PROPOFOL 150 MCG/KG/MIN: 10 INJECTION, EMULSION INTRAVENOUS at 08:34

## 2024-06-25 RX ADMIN — LIDOCAINE HYDROCHLORIDE 100 MG: 20 INJECTION, SOLUTION INFILTRATION; PERINEURAL at 08:05

## 2024-06-25 RX ADMIN — ACETAMINOPHEN 975 MG: 325 TABLET ORAL at 06:55

## 2024-06-25 RX ADMIN — PROPOFOL 300 MG: 10 INJECTION, EMULSION INTRAVENOUS at 08:05

## 2024-06-25 RX ADMIN — PROPOFOL 60 MG: 10 INJECTION, EMULSION INTRAVENOUS at 10:02

## 2024-06-25 RX ADMIN — SODIUM CHLORIDE, SODIUM LACTATE, POTASSIUM CHLORIDE, CALCIUM CHLORIDE: 600; 310; 30; 20 INJECTION, SOLUTION INTRAVENOUS at 07:45

## 2024-06-25 NOTE — PROCEDURES
Patient received right side Interscalene nerve block  with Exparel.  Fentanyl 50mcg and Versed 1mg given. Tolerated procedure well.

## 2024-06-25 NOTE — ANESTHESIA CARE TRANSFER NOTE
Patient: Damien Rasmussen    Procedure: Procedure(s):  RIGHT ARTHROSCOPY, SHOULDER, WITH MASSIVE  ROTATOR CUFF REPAIR, allograft augmentation, possible bicepstenotomy, subacromial space decompression       Diagnosis: Complete tear of right rotator cuff, unspecified whether traumatic [M75.121]  Diagnosis Additional Information: No value filed.    Anesthesia Type:   General     Note:    Oropharynx: oropharynx clear of all foreign objects  Level of Consciousness: drowsy  Oxygen Supplementation: face mask  Level of Supplemental Oxygen (L/min / FiO2): 6  Independent Airway: airway patency satisfactory and stable  Dentition: dentition unchanged  Vital Signs Stable: post-procedure vital signs reviewed and stable  Report to RN Given: handoff report given  Patient transferred to: PACU  Comments: Resps easy and reg. Report to PACU RN   Handoff Report: Identifed the Patient, Identified the Reponsible Provider, Reviewed the pertinent medical history, Discussed the surgical course, Reviewed Intra-OP anesthesia mangement and issues during anesthesia, Set expectations for post-procedure period and Allowed opportunity for questions and acknowledgement of understanding      Vitals:  Vitals Value Taken Time   /89 06/25/24 1130   Temp 35.9  C (96.7  F) 06/25/24 1126   Pulse 86 06/25/24 1135   Resp 17 06/25/24 1135   SpO2 97 % 06/25/24 1135   Vitals shown include unfiled device data.    Electronically Signed By: AKHIL Martinez CRNA  June 25, 2024  11:35 AM

## 2024-06-25 NOTE — ANESTHESIA POSTPROCEDURE EVALUATION
Patient: Damien Rasmussen    Procedure: Procedure(s):  RIGHT ARTHROSCOPY, SHOULDER, WITH MASSIVE  ROTATOR CUFF REPAIR, allograft augmentation, possible bicepstenotomy, subacromial space decompression       Anesthesia Type:  General    Note:  Disposition: Outpatient   Postop Pain Control: Uneventful            Sign Out: Well controlled pain   PONV: No   Neuro/Psych: Uneventful            Sign Out: Acceptable/Baseline neuro status   Airway/Respiratory: Uneventful            Sign Out: Acceptable/Baseline resp. status   CV/Hemodynamics: Uneventful            Sign Out: Acceptable CV status; No obvious hypovolemia; No obvious fluid overload   Other NRE:    DID A NON-ROUTINE EVENT OCCUR?            Last vitals:  Vitals Value Taken Time   /85 06/25/24 1145   Temp 36  C (96.8  F) 06/25/24 1130   Pulse 81 06/25/24 1151   Resp 24 06/25/24 1151   SpO2 94 % 06/25/24 1151   Vitals shown include unfiled device data.    Electronically Signed By: Pantera Cabrera MD  June 25, 2024  12:01 PM

## 2024-06-25 NOTE — OP NOTE
PATIENT NAME: Damien Rasmussen  1960  [id number]     DATE: June 25, 2024    PREOPERATIVE DIAGNOSES:   1. Right shoulder massive rotator cuff tear  2. Right shoulder subacromial bursitis  3. Right shoulder long head biceps disease.  4.  Right shoulder dislocation    POSTOPERATIVE DIAGNOSES:   1. Right shoulder massive rotator cuff tear  2. Right shoulder subacromial bursitis  3. Right shoulder long head biceps disease.  4.  Right shoulder dislocation     PROCEDURES:   1. Right shoulder arthroscopic massive rotator cuff repair with allograft augmentation.   2. Right shoulder arthroscopic glenohumeral debridement, extensive  3. Right shoulder subacromial bursectomy   4. Right shoulder arthroscopic biceps tenotomy  5.  Right shoulder manipulation under anesthesia    STAFF SURGEON: Amalia Juan MD     ASSISTANT: Chula Ochoa PA-C; Lexis Sinha MD     The assistance of Chula Ochoa was necessitated by the complexity of the case and need for an assistant including instrument management in repair of this tear.  No suitably qualified resident or fellow was available nurse for assistance    ANESTHESIA: General endotracheal anesthesia with regional block.     ESTIMATED BLOOD LOSS: 5 mL.     COMPLICATIONS: None.     BRIEF PATIENT HISTORY: Damien Rasmussen is a 63 year old male I have seen in clinic. Please refer to that documentation. He has an MRI which demonstrated a full thickness tear involving the supraspinatus and infraspinatus as well as subluxation of the biceps tendon as a result of an acute traumatic tear after shoulder dislocation which happened at work.  Patient did have a lot of preoperative stiffness and was sent for preoperative physical therapy.. As this was an acute traumatic injury, the patient was indicated for surgical intervention. He wishes at this time to proceed with surgical intervention. We had discussed the risks and benefits of both non-operative and surgical management. We discussed the  expected postoperative restrictions. We discussed the risks of surgery including failure of the cuff to heal or risk of retear, risk of being no better or even worse if he  became stiff, hardware related complications, infected, had an injury to the nerves or arteries which power the arm or hand or had a reaction to anesthesia. We discussed the postoperative rehabilitation course. The patient wished to proceed with surgery and informed consent was completed.    DESCRIPTION OF PROCEDURE: The patient was identified in the preoperative area and the correct Right shoulder was marked for surgery. The patient was provided an interscalene block by our Anesthesia colleagues and was taken to the operating room where general endotracheal anesthesia was induced. The patient was moved to the operating table in the beachchair position with all bony prominences well padded. The head was placed in a head talamantes with the neck in neutral position. The Right upper extremity was prepped and draped in the usual sterile fashion. A timeout was held in accordance with hospital policy, confirming correct patient, side, site, procedure and administration of IV antibiotics prior to incision.  Examination under anesthesia was performed which revealed stiffness, I was able to forward elevate to 110 degrees, externally rotate to 50 degrees, I performed a gentle manipulation under anesthesia and was able to forward flex to 160 degrees prior to starting the procedure.  External rotation was to 60 degrees.      I initiated shoulder arthroscopy through a posterior viewing portal. I created an anterior working portal under direct visualization. I performed a diagnostic arthroscopy. There was subluxation of the bicep tendon.  There is significant inflammation of the rotator interval.  I can visualize a complete articular sided tear of the supraspinatus and infraspinatus that was retracted to above the glenoid.  There was chondromalacia of the  inferior glenoid grade 1 and 2.  There was anterior labral tearing that extended into the superior labrum.  I started by releasing the biceps tendon.  I then released the rotator interval.  I examined the subscapularis and found that there is no significant articular sided tear.  I therefore did not feel like a subscapularis repair was necessary.  I debrided the anterior and superior labrum as well as the posterior labrum and the chondromalacia on the glenoid..    The trocar and cannula were then redirected to the subacromial space.  The arthroscope and inflow were inserted.  A anterolateral portal was established after localizing the subacromial space with a spinal needle.  There was significant bursal inflammation and thickening of the subacromial space.  Using both a shaver and an ArthroCare the bursa and tissue beneath the acromion were removed.  TThe arthroscope was then switched to the lateral portal and  The shaver was then used to complete the bursectomy removing all of the thickened bursal tissue over the rotator cuff including posterior lateral gutters.    The rotator cuff was then inspected.  There was evidence of a full-thickness tear involving the supraspinatus and infraspinatus off the the lateral margin.  There was significant retraction of the rotator cuff tendon fibers back to the level of the glenoid.  The infraspinatus tissue was overall quite healthy and robust was able to easily reduce however the supraspinatus was somewhat more thickened and adhered. A posterior lateral portal was established for viewing.  I started by using a grasper to see with reduction was possible, the infraspinatus was fully reducible but the supraspinatus was somewhat adhered.  I then started by clearing the interval using both a combination of shaver and ArthroCare.  I then accessed the supraspinatus again and found that it slight medialization I was able to get it down the footprint.  The greater tuberosity was lightly  decorticated to bleeding bony surface.  We then inserted 3 Arthrex corkscrew anchors into the greater tuberosity lateral to the articular margin at the site of the tear.  Sutures from these anchors were passed through the rotator cuff with a Smith and Nephew firstpass device.  I placed a traction suture prior to tying down the sutures.  The cuff was held in a reduced position and the sutures were tied down bringing the cuff nicely down.  Next a suture from each anchor was then brought down laterally for a double row repair.  2 4.75 mm Arthrex bio swivel lock knotless anchors were then placed in the lateral greater tuberosity for completion of a transosseous equivalent double row repair.  This nicely reapproximated the tendon to the greater tuberosity.  Given the large nature of the tear as well as the mobility of the supraspinatus I felt like a graft would be necessary.  An Arthrex cuff mend graft was then opened and prepped on the back table.  I intended to place this more over the supraspinatus given that this was the area that was more retracted and the tendon tissue less robust.  Once prepped the graft was introduced through the anterolateral portal.  It was opened and placed over the supraspinatus tendon.  Using the prior percutaneous portal used for medial row anchors I placed a cannula to place fiber stitch sutures through the graft and tendon.  These were tensioned down securing the graft medially.  Knotless mechanisms from the prior lateral anchors were then passed through the appropriate sutures.  We started by tensioning down the anterior anchor.  Then we then used a grasper through the posterior portal to hold the graft for the reduced intention the posterior anchor.  The graft lay nicely down over the supraspinatus tendon. Final debridement was performed.    All instruments were removed from the shoulder and then portals were closed with interrupted 3-0 Monocryl. Steri-Strips and a soft sterile dressing  were applied. The arm was placed into an abduction sling and the patient was extubated and transported to the recovery room in stable condition. There were no apparent intraoperative complications.  Sponge and needle count were correct at the end of the case.  I spoke to the patient's family in the waiting room.      Amalia Juan MD      Modifier 22: Due to the massive nature of this tear with retraction, more than 2 tendon involvement, need for graft augmentation this procedure took approximately 50% longer or time for multiple anchors, suture passage and timing, graft preparation and graft placement.

## 2024-06-25 NOTE — ANESTHESIA PROCEDURE NOTES
Airway       Patient location during procedure: OR  Staff -        CRNA: Beata Gee APRN CRNA       Performed By: CRNA  Consent for Airway        Urgency: elective  Indications and Patient Condition       Indications for airway management: fabiana-procedural       Induction type:intravenous       Mask difficulty assessment: 1 - vent by mask    Final Airway Details       Final airway type: supraglottic airway    Supraglottic Airway Details        Type: LMA       Brand: I-Gel       LMA size: 5    Post intubation assessment        Placement verified by: capnometry, equal breath sounds and chest rise        Number of attempts at approach: 1       Secured with: tape       Ease of procedure: easy       Dentition: Unchanged

## 2024-06-25 NOTE — DISCHARGE INSTRUCTIONS
"[facility]  Home Care Following Anesthesia  For 24 hours after surgery:  Get plenty of rest.  A responsible adult must stay with you for at least 24 hours after you leave the surgery center.  Do not drive or use heavy equipment.  If you have weakness or tingling, don't drive or use heavy equipment until this feeling goes away.   Do not drink alcohol.   Avoid strenuous or risky activities.  Ask for help when climbing stairs.  You may feel lightheaded.  IF so, sit for a few minutes before standing.  Have someone help you get up.   If you have nausea (feel sick to your stomach): Drink only clear liquids such as apple juice, ginger ale, broth or 7-Up.  Rest may also help.  Be sure to drink enough fluids.  Move to a regular diet as you feel able.   You may have a slight fever.  Call the doctor if your fever is over 100 F (37.7 C) (taken under the tongue) or lasts longer than 24 hours.  You may have a dry mouth, a sore throat, muscle aches or trouble sleeping. These should go away after 24 hours.  Do not make important or legal decisions.   It is recommended to avoid smoking.        Today you received an Exparel block to numb the nerves near your surgery site.  This is a block using local anesthetic or \"numbing\" medication injected around the nerves to anesthetize or \"numb\" the area supplied by those nerves.  This block is injected into the muscle layer near your surgical site.  This medication may numb the location where you had surgery up to 72 hours.  If your surgical site is an arm or leg you should be careful with your affected limb, since it is possible to injure your limb without being aware of it due to the numbing.  Until full feeling returns, you should guard against bumping or hitting your limb, and avoid extreme hot or cold temperatures on the skin.  As the block wears off, the feeling will return as a tingling or prickly sensation near your surgical site.  You will " experince more discomfort from your incision as the feeling returns.  You may want to take a pain pill (a narcotic or Tylenol if this was prescribed by your surgeon) when you start to experience mild pain before the pain beomes more severe.  If your pain medications do not control your pain, you should notify your surgeon.    Tips for taking pain medications  To get the best pain relief possible, remember these points:  Take pain medications as directed, before pain becomes severe.  Pain medication can upset your stomach: taking it with food may help.  Constipation is a common side effect of pain medication. Drink plenty of  fluids.  Eat foods high in fiber. Take a stool softener if recommended by your doctor or pharmacist.  Do not drink alcohol, drive or operate machinery while taking pain medications.  Ask about other ways to control pain, such as with heat, ice or relaxation.    Tylenol/Acetaminophen Consumption    If you feel your pain relief is insufficient, you may take Tylenol/Acetaminophen in addition to your narcotic pain medication.   Be careful not to exceed 4,000 mg of Tylenol/Acetaminophen in a 24 hour period from all sources.  If you are taking extra strength Tylenol/acetaminophen (500 mg), the maximum dose is 8 tablets in 24 hours.  If you are taking regular strength acetaminophen (325 mg), the maximum dose is 12 tablets in 24 hours.    Call a doctor for any of the following:  Signs of infection (fever, growing tenderness at the surgery site, a large amount of drainage or bleeding, severe pain, foul-smelling drainage, redness, swelling).  It has been over 8 to 10 hours since surgery and you are still not able to urinate (pass water).  Headache for over 24 hours.  Numbness, tingling or weakness the day after surgery (if you had spinal anesthesia).  Signs of Covid-19 infection (temperature over 100 degrees, shortness of breath, cough, loss of taste/smell, generalized body aches, persistent headache,  chills, sore throat, nausea/vomiting/diarrhea)  Your doctor is:       Dr. Amalia Juan, Orthopaedics: [phone]               Or dial [phone] and ask for the resident on call for:  Orthopaedics  For emergency care, call the:  [facility] Emergency Department: [phone] (TTY for hearing impaired: [phone])  Tylenol 975 mg given at 7 am.  Next available dose at 1 pm.                      ROTATOR CUFF REPAIR     POST OP INSTRUCTIONS  Amalia Juan MD  [phone]   of Orthopedic Surgery  [facility]      ABOUT YOUR SURGERY  With this surgery, we took your torn rotator cuff tendon and repaired it to its attachment site at the ball (humeral head) of your shoulder. With time, the tendon will heal back to the bone and function to again provide stabilization and motion at your shoulder joint. A biologic patch may have been added over the top of your repair to help augment the healing response.    POST OP OFFICE VISIT  You should have an office visit scheduled with Dr. Juan within 7-10 days after your surgery. If you do not have this appointment please call [phone] to set up an appointment.      MEDICATIONS  Unless you have allergy or contraindication to these medications, you will receive prescriptions for pain medication (and other applicable medications) on the day of your surgery   The goal of the multimodal pain regimen is to minimize the amount of narcotic (opioid) medications that you require for postoperative pain control - if possible, try and take the non-narcotic pain medications instead of the narcotic medication unless you feel you need it. You should discuss these medications with your primary care doctor to make sure there are no interactions with any other medications that you are taking, and that your kidneys and/or liver are healthy enough for their use.  Unless otherwise specified, you will be prescribed  Hydrocodone-Acetaminophen 5-325 mg (Norco; #10 total) for pain. You may take 1 or 2 tablets every 4-6 hours as needed.   You will also be prescribed Celecoxib 100 mg twice daily (Celebrex; #30 total) for pain as needed. This should be your option for pain control if the pain is not so bad as to require the narcotic medication(s).  You will also be prescribed Gabapentin 100 mg every 8 hours (Neurontin; #21 total) to be taken as scheduled. This medication affects chemicals and nerves in the body and is another component of the multi-modal pain regimen. Use of this medication will hopefully reduce comsumption of the narcotic pain medications. Its use can contribute to drowsiness, sleepiness, or dizziness in some patients.    Once you are off the Norco and Celebrex, or immediately after surgery, you may start taking Tylenol and Motrin. Unless you have a medical condition that prevents the use of Tylenol or Motrin, you can take 400 mg of Motrin and 650 mg of Tylenol TOGETHER or in a staggered fashion every 6 hours as needed. Do NOT take Norco and Tylenol together (as Norco contains tylenol), and do not take Celebrex and Motrin together.  You will also receive a prescription for a nausea medicine called Ondansetron 4 mg ODT (Zofran; #10 total). You may take one tablet every 8 hours as needed for nausea.  You are strongly encouraged to take a stool softener and/or laxative while taking narcotic pain medicine. You will be prescribed Colace 100 mg (#30 total), a stool softener, which can be taken twice daily for constipation as needed.  Finally, you will receive a prescription for Prilosec 20 mg (Nexium; #14 total) which we recommend to take every morning in order to combat the occurrence of heartburn while on the medications above in the postoperative period.  If you are having pain beyond what is expected from surgery and you need to speak to someone from our office, please do not wait until after 4 PM on weekdays or over  the weekend, as we will not be able to address it.  It is hospital policy that we do not refill narcotic pain medicine after hours or on weekends.  Please call [phone] for further instructions.    ANTICOAGULATION (BLOOD THINNERS)  Typically, unless you have a history of blood clot or PE, you will not be prescribed medications for blood clot prophylaxis. If you take a blood thinner at baseline, as Dr. Juan or look on your discharge instructions for when to resume these. Usually, you will resume them the day after surgery.   Ambulation, foot/hand pumps, and movement of the surgical site within the confines of the weightbearing and motion restrictions below are additional ways to reduce the occurrence of blood clots after surgery.    DRESSING CHANGES, WOUND CARE, AND BATHING  You will leave the operating room with a bulky, compressive dressing over your surgical incision. At 24 hours after surgery, this can be removed and replaced with waterproof band-aids. Leave white steri strips in place. You may notice a small amount of clear or reddish drainage in the center of the waterproof bandage - this is normal. However, if you saturate through the waterproof bandage and it looses its seal over the surgical site, please replace accordingly until you are seen for your initial postoperative visit.    Please keep your wounds clean and dry. You may shower with waterproof bandage over the wound, but you should not bathe or soak your surgical site. Please do not apply any lotions, creams, or ointments directly to the incisions until 30 days after surgery. After your sutures are removed at your first post op visit, you may get your incisions wet and they no longer need to be covered.  AVOID STEAM ROOMS, SWIMMING POOLS, AND TUBS FOR A FULL 4 WEEKS AFTER THE DATE OF YOUR SURGERY TO AVOID INFECTION.    SWELLING / INFLAMMATION CONTROL  Icing the surgical is very important following surgery. In most cases you will be offered a Polar  Care unit to use after surgery.  This unit is a cooler which circulates cold water through a cuff. This will be provided to you by [employer] if you wish to obtain one.  This device may be left on the surgical site for extended periods of time. There is a rental fee to use this device, or it may be purchased directly. For any questions regarding this equipment please call [employer] directly at [phone].  If you choose to use regular ice packs, please limit icing to 20 minute sessions every 2-3 hours at most to avoid any skin problems.   Icing should be continued for the first several weeks following surgery.   In addition to icing, compression with a support/wrap and elevation of the affected limb above the level of your heart will promote good circulation and reduce both swelling and pain.  It is normal to have swelling and/or bruising around your incisions, or about the surgical extremity after surgery. This will gradually resolve after surgery.  PROLONGED FEVER OVER 102 DEGREES, THICK DRAINAGE, CHEST PAIN, SHORTNESS OF BREATH, OR CALF PAIN SHOULD BE REPORTED IMMEDIATELY.  PLEASE CALL US [phone] IF YOU EXPERIENCE THESE SYMPTOMS.     WEIGHTBEARING STATUS / IMMOBILIZATION  You should not bear any weight with your surgical extremity while it is in the sling, and until you are instructed as part of the postoperative rehabilitation protocol.    REHABILITATION / PHYSICAL THERAPY  Physical therapy and sling instructions will be different depending on the type of rotator cuff surgery you have had. Sometimes, Dr. Juan will use a Rotator Cuff Patch in addition to repairing the rotator cuff. This decision is sometimes made during surgery and may change your rehabilitation. You will be told by Dr. Juan or a member of her  team which kind of surgery you have had in the recovery area, and if/how it may change your rehabilitation.   In most cases, formal physical therapy will be started at 1 weeks post op - for some tear  types this will be delayed until several weeks after your surgery; the therapy can ultimately be done at any facility you like and clearance to begin PT will be provided when appropriate  You must wear the sling for 4-6 weeks. It is ok to remove the sling while sitting with the arm supported on a pillow. While the sling is off it is ok to move the elbow, wrist, and hand. It is normal to develop bruising and swelling of the upper arm, just above the sling. This can be improved by removing the sling more frequently. However, if you are up walking around or outside your house, you should have the sling on. You should also wear the sling at night for sleep for 4-6 weeks.   The priority following this surgery is allowing the cuff to heal. In order to do this and minimize stiffness, Dr. Juan may recommend that you begin home exercises with passive shoulder motion only (pendulums), elbow/wrist range of motion, and  strengthening until you are seen at your first postoperative appointment; however, there are some degrees of rotator cuff tear for which Dr. Juan will not want you doing any shoulder range of motion (you will be informed accordingly either before or after surgery).       DRIVING AFTER SURGERY  The ability for someone to resume driving after surgery is seldom a medical question, but more often a legal question.  Driving with any form of a brace on may be interpreted as driving while impaired.  It is the responsibility of all licensed drivers to drive safely at all times no matter what their permanent or temporary impairment may be.      WORK AFTER SURGERY  Discussions of return to work will depend on the type of work that you perform, and the immediacy of needs to return. This discussion will be had preoperatively and at each visit postoperatively in order to help you, the patient, to get back to what you need to be doing in a timely fashion, while not compromising your surgical outcome.     DIET AFTER  SURGERY  A balanced, healthy diet high in proteins is most valuable for the body to utilize during the healing process after surgery. There are otherwise no formal restrictions on your diet after surgery.     SLEEPING TIPS  Unfortunately, it is often difficult to get quality sleep following shoulder surgery of any kind. Many patients do better in a recliner or propped up on pillows.  It may be best to take pain medicine shortly before bed, as these medicines typically have a sedating effect. If you still have to wear a sling, sometimes removing the pillow that is attached may help significantly. It is attached with velcro. Gunslinger braces should not be modified.    OTHER COMMENTS  We recommend to abstain from alcohol or tobacco use in the postoperative period. This is for general health reasons, as combination with the prescribed medications can be dangerous, but also to prevent adversely affecting the body's healing response to your surgery.      ***If you have questions and need to speak to the nurse, please send us a message via FaceRig, or contact us at our triage center at [phone]    ***If you have an emergency after hours or on the weekend call the physician on call at [phone] or [phone]

## 2024-06-25 NOTE — ANESTHESIA PROCEDURE NOTES
Brachial plexus Procedure Note    Pre-Procedure   Staff -        Anesthesiologist:  Pantera Cabrera MD       Performed By: anesthesiologist       Location: pre-op       Procedure Start/Stop Times: 6/25/2024 7:40 AM and 6/25/2024 7:45 AM       Pre-Anesthestic Checklist: patient identified, IV checked, site marked, risks and benefits discussed, informed consent, monitors and equipment checked, pre-op evaluation, at physician/surgeon's request and post-op pain management  Timeout:       Correct Patient: Yes        Correct Procedure: Yes        Correct Site: Yes        Correct Position: Yes        Correct Laterality: Yes        Site Marked: Yes  Procedure Documentation  Procedure: Brachial plexus       Diagnosis: RIGHT SHOULDER PAIN       Laterality: right       Patient Position: sitting       Patient Prep/Sterile Barriers: mask       Skin prep: Chloraprep (interscalene approach).       Needle Type: short bevel       Needle Gauge: 22.        Needle Length (millimeters): 50        Ultrasound guided       1. Ultrasound was used to identify targeted nerve, plexus, vascular marker, or fascial plane and place a needle adjacent to it in real-time.       2. Ultrasound was used to visualize the spread of anesthetic in close proximity to the above referenced structure.       3. A permanent image is entered into the patient's record.       4. The visualized anatomic structures appeared normal.       5. There were no apparent abnormal pathologic findings.    Assessment/Narrative         The placement was negative for: blood aspirated, painful injection and site bleeding       Paresthesias: No.       Bolus given via needle..        Secured via.        Insertion/Infusion Method: Single Shot       Complications: none       Injection made incrementally with aspirations every 2 mL.    Medication(s) Administered   Bupivacaine 0.5% PF (Infiltration) - Infiltration   15 mL - 6/25/2024 7:40:00 AM  Bupivacaine liposome (Exparel) 1.3% LA inj  "susp (Infiltration) - Infiltration   10 mL - 6/25/2024 7:40:00 AM  Medication Administration Time: 6/25/2024 7:40 AM     Comments:  133mg Exparel injected      FOR [facility] [place] ONLY:   Pain Team Contact information: please page the Pain Team Via Clipboard. Search \"Pain\". During daytime hours, please page the attending first. At night please page the resident first.      "

## 2024-06-25 NOTE — BRIEF OP NOTE
Orthopaedic Surgery Brief Op-Note     Patient: Damien Rasmussen  : 1960  Date of Service: 2024 11:26 AM    Preoperative Diagnosis: Complete tear of right rotator cuff, unspecified whether traumatic [M75.121]     Postoperative Diagnosis: same    Procedure: Procedure(s):  RIGHT ARTHROSCOPY, SHOULDER, WITH MASSIVE  ROTATOR CUFF REPAIR, allograft augmentation, possible bicepstenotomy, subacromial space decompression    Surgeon: Dr. Juan    Resident assisting: Lexis Sinha, PGY-4    Assistants:  Chula Ochoa PA-C    Anesthesia: General, brachial plexus block    Estimated Blood Loss: 5 cc       Implant Name Type Inv. Item Serial No.  Lot No. LRB No. Used Action   IMP ANCHOR ARTHREX 4.5MM BIOCOMP CKSCR W/FB [id number] - [id number] Metallic Hardware/[place] IMP ANCHOR ARTHREX 4.5MM BIOCOMP CKSCR W/FB [id number]  ARTHREX [id number] Right 3 Implanted   4.75mm BioComposite Knotless SwivelLock [place], Self Punching with Blue #2 Sutures    ARTHREX [id number] Right 1 Implanted   4.75mm BioComposite Knotless SwivelLock [place], Self Punching with Blue #2 Sutures    ARTHREX [id number] Right 1 Implanted   FiberStitch Implant 1.5 curved (RC) with two polyester implants and 2-0 Fiberwire suture, anatomical ligature    ARTHREX [id number] Right 1 Wasted   ArthroGLEX Decellularized Dermis 2.0x2.5 cm   [id number]   Right 1 Implanted   FiberStitch implant 1.5 curved with two polyester implant and 2-0 FiberWire suture anatomical ligature    ARTHREX [id number] Right 4 Implanted     Specimen: none       Complications: none    Condition: stable    Findings: please see dictated operative note    Plan:  NWB, sling at all times except  PT as outpatient, delayed for 4-6 weeks until cleared by Dr. Juan  ADAT  Dressings in place for 24 hours. Then may remove bulky dressing, leave steris in place, and apply waterproof bandaids.   Keep incisions dry until clinic recheck.   Pain control with multimodal orals prn  Bowel prophylaxis with  senna-docusate  DVT prophylaxis: mechanical only     Disposition: patient may be discharged with  once appropriate discharge criteria have been met    I assisted with positioning, prepping and draping, and closure.    PRISCA ESPINAL PA-C  6/25/2024 11:26 AM  Orthopaedic Surgery    Thank you for allowing me to participate in this patient's care.   [facility]  Securely message with the [employer] Web Console (learn more here)  Text page via [employer] Paging/Directory

## 2024-07-01 ENCOUNTER — TELEPHONE (OUTPATIENT)
Dept: ORTHOPEDICS | Facility: CLINIC | Age: 64
End: 2024-07-01
Payer: COMMERCIAL

## 2024-07-01 ENCOUNTER — OFFICE VISIT (OUTPATIENT)
Dept: ORTHOPEDICS | Facility: CLINIC | Age: 64
End: 2024-07-01
Payer: OTHER MISCELLANEOUS

## 2024-07-01 DIAGNOSIS — Z98.890 S/P ROTATOR CUFF REPAIR: Primary | ICD-10-CM

## 2024-07-01 PROCEDURE — 99024 POSTOP FOLLOW-UP VISIT: CPT | Performed by: ORTHOPAEDIC SURGERY

## 2024-07-01 NOTE — LETTER
2024         Patient:  Damien Rasmussen  :   1960  MRN:     [id number]  Physician: ED MCNAMARA    Damien Rasmussen may return to work with the following restrictions:  Sedentary desk work only  No use of the right upper extremity       The next clinic appointment is scheduled for (date/time) 24.      Please call the clinic with any questions,      Electronically signed by ED MCNAMARA MD

## 2024-07-01 NOTE — LETTER
7/1/2024      Damien Rasmussen  [address] [address]      Dear Colleague,    Thank you for referring your patient, Damien Rasmussen, to the [facility] [place]. Please see a copy of my visit note below.    CHIEF COMPLAINT: Status post   1. Right shoulder arthroscopic massive rotator cuff repair with allograft augmentation.   2. Right shoulder arthroscopic glenohumeral debridement, extensive  3. Right shoulder subacromial bursectomy   4. Right shoulder arthroscopic biceps tenotomy  5.  Right shoulder manipulation under anesthesia     DATE OF SURGERY: 6/25/24    HISTORY OF PRESENT ILLNESS: Damien is an extremely pleasant 63 year-old right-hand dominant male who is 1 week status post the above procedure.  He reports minimal pain that comes and goes.  SANE R - 0 L - 100    EXAM:  Pleasant adult 63 year old in no distress.  Respirations even and unlabored.  Right upper extremity: Incisions clean, dry, and intact. No erythema. No drainage. Sens intact Ax/Musc/Med/Rad/Uln nerves. Motor intact EPL, FPL, and Intrinsics. Shoulder range of motion not tested due to postop restrictions.      ASSESSMENT:  1. 1 week status post above procedure    PLAN:  I reviewed the intraop findings.  We discussed the plan for rehabilitation with delaying until 6 weeks due to the massive nature of the tear as well as need for allograft augmentation.  I discussed need to keep the sling on at all times except for bathing and dressing or home exercises.  Patient was encouraged to perform active range of motion exercises about the hand wrist and elbow.  We discussed a step down regimen for pain medication to over the counter medications. The patient will start physical therapy.  I will see the patient back in 5 weeks.      Amalia Juan  Orthopedic Surgery Sports Medicine and Shoulder Surgery

## 2024-07-01 NOTE — TELEPHONE ENCOUNTER
Other: Patient is calling in for a workability to be sent to him via [employer]. Patient needs to know if he is able to work since this is work comp. Please call patient with questions, otherwise please add to [employer].      Could we send this information to you in [employer] or would you prefer to receive a phone call?:   Patient would prefer a phone call   Okay to leave a detailed message?: Yes at Home number on file [phone] (home)

## 2024-07-02 ENCOUNTER — TELEPHONE (OUTPATIENT)
Dept: ORTHOPEDICS | Facility: CLINIC | Age: 64
End: 2024-07-02
Payer: COMMERCIAL

## 2024-07-02 NOTE — TELEPHONE ENCOUNTER
Letter was typed and sent to Pts kg a few hrs ago. Called pt and LVM for him to send the letter to his employer as they are calling       Isha

## 2024-07-02 NOTE — TELEPHONE ENCOUNTER
"ED Provider Note    CHIEF COMPLAINT  Chief Complaint   Patient presents with    Alcohol Intoxication     BIB by EMS from home. Pt reports drinking vodka today. Pt arrives GCS 15. Denies drug abuse    Pain     Pt reports pain on his penis. Denies discharges. +dysuria       EXTERNAL RECORDS REVIEWED  Inpatient Notes patient was admitted from March 14 to March 16 for atrial fibrillation with RVR and underwent cardioversion back to normal sinus rhythm    HPI/ROS  LIMITATION TO HISTORY   Select: Intoxication versus altered mental status from illness  OUTSIDE HISTORIAN(S):  None    Chace Gong is a 56 y.o. male who presents for evaluation after he states his wife had all of his alcohol.  He states that he drinks a gallon of vodka a day but his last drink was 10 hours ago.  He states that he is in alcohol withdrawal.  He also states that he has been having some painful urination for the past couple days.  Otherwise obtaining a history is very difficult from the patient as he is restless and hyper focuses on not having alcohol.  He reports that he recently had a cardioversion for A-fib.  He states that he has been taking all of his medications but when asked what medications he is taking he states, \"ask Judy\" (his wife). He was found to be hypoxic on arrival and is not normally on oxygen.     PAST MEDICAL HISTORY   has a past medical history of Achilles tendinitis (6/24/2009), Arthralgia (6/24/2009), Bronchitis (6/24/2009), Dyslipidemia (6/24/2009), GERD (gastroesophageal reflux disease) (6/24/2009), Hepatitis C, HTN (hypertension) (6/24/2009), Hypertension, Low back pain, and Psychiatric disorder.    SURGICAL HISTORY   has a past surgical history that includes neurolysis (1/21/2009); other orthopedic surgery (2001); carpal tunnel release (1/21/2009); hardware removal ortho (1/21/2009); synovectomy (1/21/2009); arthrotomy (2011); arthroscopy, knee (2013); elbow arthrotomy (5/17/2013); and loose body removal " Letter has been written and is available in MyChart   (2013).    FAMILY HISTORY  Family History   Problem Relation Age of Onset    Cancer Mother         lung / throat / breast    Hypertension Mother     Hypertension Father     Hyperlipidemia Father     Stroke Father     Alcohol/Drug Father     Diabetes Sister     Hypertension Sister     Alcohol/Drug Sister     Cancer Brother     Hypertension Brother     Alcohol/Drug Brother     Stroke Brother     Alcohol/Drug Brother     Cancer Sister         lung    Heart Disease Neg Hx        SOCIAL HISTORY  Social History     Tobacco Use    Smoking status: Some Days     Current packs/day: 0.00     Average packs/day: 0.5 packs/day for 30.0 years (15.0 ttl pk-yrs)     Types: Cigarettes     Start date: 2015     Last attempt to quit: 2015     Years since quittin.3    Smokeless tobacco: Never    Tobacco comments:     initiated cessation 12 Aug 2015, occasional cigar   Vaping Use    Vaping Use: Never used   Substance and Sexual Activity    Alcohol use: Yes     Alcohol/week: 0.0 oz     Comment: vodka /beer    Drug use: Yes     Types: Inhaled, Marijuana     Comment: has med marijuana card; smokes 1 joint every morning, speed    Sexual activity: Yes     Partners: Male       CURRENT MEDICATIONS  Home Medications       Reviewed by Rehana JAMES R.N. (Registered Nurse) on 24 at 0342  Med List Status: Partial     Medication Last Dose Status   ARIPiprazole (ABILIFY) 10 MG Tab  Active   benzonatate (TESSALON) 100 MG Cap  Active   diphenhydrAMINE (BENADRYL) 25 MG capsule  Active   folic acid (FOLVITE) 1 MG Tab  Active   gabapentin (NEURONTIN) 300 MG Cap  Active   guaiFENesin dextromethorphan (ROBITUSSIN DM) 100-10 MG/5ML Syrup syrup  Active   lisinopril (PRINIVIL) 20 MG Tab  Active   multivitamin (THERAGRAN) Tab  Active   nicotine (NICODERM) 21 MG/24HR PATCH 24 HR  Active   tamsulosin (FLOMAX) 0.4 MG capsule  Active   thiamine (THIAMINE) 100 MG tablet  Active                    ALLERGIES  Allergies   Allergen  "Reactions    Penicillins Unspecified     Pt states that his mom told him he is allergic to penicillins.    Hydrocodone Unspecified     Pt states \"I'm not sure\".    Sulfa Drugs      \"my mom told me I was allergic to it when i was a kid\"       PHYSICAL EXAM  VITAL SIGNS: /67   Pulse (!) 105   Temp 36.7 °C (98 °F) (Temporal)   Resp 15   Ht 1.88 m (6' 2\")   Wt 118 kg (260 lb)   SpO2 89%   BMI 33.38 kg/m²      Constitutional: Alert, speaking full sentences, states he feels like he is \"vibrating\"  HEENT: Normocephalic, Atraumatic,  external ears normal, pharynx pink,  Mucous  Membranes moist, No rhinorrhea or mucosal edema, no significant tongue fasiculations  Eyes: PERRL, EOMI, Conjunctiva normal, No discharge.   Neck: Normal range of motion, No tenderness, Supple, No stridor.   Cardiovascular: Tachycardic, regular Rhythm, No murmurs,  rubs, or gallops.   Thorax & Lungs: Speaking full sentences, prolonged expiratory phase  Abdomen: Soft, minimal tenderness to RLQ, otherwise abdomen is non-tender, non distended,  No pulsatile masses., no rebound guarding or peritoneal signs.   Skin: Warm, Dry, No erythema, No rash,   Back:  No CVA tenderness,  No spinal tenderness, bony crepitance step offs or instability.   : With female nurse chaperone, no swelling of the testicles, normal circumcised male genitalia, no swelling or erythema  Extremities: Equal, intact distal pulses, No cyanosis, clubbing or edema,  No tenderness.   Neurologic: Alert & oriented to person, place, year, difficult for him to say why he is in the hospital as he states he has not been drinking alcohol, mild tremor   Psychiatric: Anxious    EKG/LABS  Results for orders placed or performed during the hospital encounter of 04/05/24   CBC WITH DIFFERENTIAL   Result Value Ref Range    WBC 19.7 (H) 4.8 - 10.8 K/uL    RBC 5.32 4.70 - 6.10 M/uL    Hemoglobin 17.8 14.0 - 18.0 g/dL    Hematocrit 49.0 42.0 - 52.0 %    MCV 92.1 81.4 - 97.8 fL    MCH 33.5 " (H) 27.0 - 33.0 pg    MCHC 36.3 32.3 - 36.5 g/dL    RDW 45.8 35.9 - 50.0 fL    Platelet Count 186 164 - 446 K/uL    MPV 10.6 9.0 - 12.9 fL    Neutrophils-Polys 82.90 (H) 44.00 - 72.00 %    Lymphocytes 9.80 (L) 22.00 - 41.00 %    Monocytes 5.20 0.00 - 13.40 %    Eosinophils 0.80 0.00 - 6.90 %    Basophils 0.30 0.00 - 1.80 %    Immature Granulocytes 1.00 (H) 0.00 - 0.90 %    Nucleated RBC 0.00 0.00 - 0.20 /100 WBC    Neutrophils (Absolute) 16.35 (H) 1.82 - 7.42 K/uL    Lymphs (Absolute) 1.93 1.00 - 4.80 K/uL    Monos (Absolute) 1.02 (H) 0.00 - 0.85 K/uL    Eos (Absolute) 0.16 0.00 - 0.51 K/uL    Baso (Absolute) 0.06 0.00 - 0.12 K/uL    Immature Granulocytes (abs) 0.20 (H) 0.00 - 0.11 K/uL    NRBC (Absolute) 0.00 K/uL   CMP   Result Value Ref Range    Sodium 131 (L) 135 - 145 mmol/L    Potassium 4.0 3.6 - 5.5 mmol/L    Chloride 91 (L) 96 - 112 mmol/L    Co2 16 (L) 20 - 33 mmol/L    Anion Gap 24.0 (H) 7.0 - 16.0    Glucose 182 (H) 65 - 99 mg/dL    Bun 29 (H) 8 - 22 mg/dL    Creatinine 0.85 0.50 - 1.40 mg/dL    Calcium 8.0 (L) 8.5 - 10.5 mg/dL    Correct Calcium 8.1 (L) 8.5 - 10.5 mg/dL    AST(SGOT) 38 12 - 45 U/L    ALT(SGPT) 39 2 - 50 U/L    Alkaline Phosphatase 65 30 - 99 U/L    Total Bilirubin 0.9 0.1 - 1.5 mg/dL    Albumin 3.9 3.2 - 4.9 g/dL    Total Protein 7.2 6.0 - 8.2 g/dL    Globulin 3.3 1.9 - 3.5 g/dL    A-G Ratio 1.2 g/dL   DIAGNOSTIC ALCOHOL   Result Value Ref Range    Diagnostic Alcohol 208.0 (H) <10.1 mg/dL   TROPONIN   Result Value Ref Range    Troponin T 27 (H) 6 - 19 ng/L   LIPASE   Result Value Ref Range    Lipase 20 11 - 82 U/L   Lactic Acid   Result Value Ref Range    Lactic Acid 6.8 (HH) 0.5 - 2.0 mmol/L   Urinalysis    Specimen: Urine   Result Value Ref Range    Color Yellow     Character Clear     Specific Gravity 1.024 <1.035    Ph 5.5 5.0 - 8.0    Glucose Negative Negative mg/dL    Ketones Negative Negative mg/dL    Protein 30 (A) Negative mg/dL    Bilirubin Negative Negative    Urobilinogen,  Urine 1.0 Negative    Nitrite Negative Negative    Leukocyte Esterase Negative Negative    Occult Blood Moderate (A) Negative    Micro Urine Req Microscopic    ESTIMATED GFR   Result Value Ref Range    GFR (CKD-EPI) 101 >60 mL/min/1.73 m 2   URINE MICROSCOPIC (W/UA)   Result Value Ref Range    WBC 0-2 (A) /hpf    RBC 5-10 (A) /hpf    Bacteria Negative None /hpf    Epithelial Cells Negative /hpf    Hyaline Cast 0-2 /lpf   MAGNESIUM   Result Value Ref Range    Magnesium 1.8 1.5 - 2.5 mg/dL   PHOSPHORUS   Result Value Ref Range    Phosphorus 3.3 2.5 - 4.5 mg/dL   PROCALCITONIN   Result Value Ref Range    Procalcitonin 0.55 (H) <0.25 ng/mL   proBrain Natriuretic Peptide, NT   Result Value Ref Range    NT-proBNP 291 (H) 0 - 125 pg/mL   EKG   Result Value Ref Range    Report       [facility]    Test Date:  2024  Pt Name:    [facility]                Department: ER  MRN:        [id number]                      Room:        13  Gender:     Male                         Technician: [id number]  :        1968                   Requested By:LUPILLO COATS  Order #:    [id number]                    Reading MD: Lupillo Coats    Measurements  Intervals                                Axis  Rate:       109                          P:          79  WI:         129                          QRS:        66  QRSD:       103                          T:          15  QT:         374  QTc:        504    Interpretive Statements  Sinus tachycardia  Nonspecific T wave changes  Prolonged QT interval  No ST elevation  Compared to ECG 2019 16:19:16  T-wave abnormality now present  Prolonged QT interval now present  Electronically Signed On 2024 07:05:03 PDT by Lupillo Coats     EKG   Result Value Ref Range    Report       [facility]    Test Date:  2024  Pt Name:    [facility]                Department: ER  MRN:        [id number]                       Room:       [facility]  Gender:     Male                         Technician: [id number]  :        1968                   Requested By:LUPILLO WHITTEN  Order #:    [id number]                    Reading MD:    Measurements  Intervals                                Axis  Rate:       191                          P:          0  AR:         0                            QRS:        52  QRSD:       92                           T:          -51  QT:         276  QTc:        493    Interpretive Statements  Atrial fibrillation with rapid V-rate  Repolarization abnormality, prob rate related  Compared to ECG 2024 03:56:42  Early repolarization now present  Sinus tachycardia no longer present  T-wave abnormality no longer present  Prolonged QT interval no longer present     POC CoV-2, FLU A/B, RSV by PCR   Result Value Ref Range    POC Influenza A RNA, PCR Negative Negative    POC Influenza B RNA, PCR Negative Negative    POC RSV, by PCR Negative Negative    POC SARS-CoV-2, PCR NotDetected      I have independently interpreted this EKG    RADIOLOGY  I have independently interpreted the diagnostic imaging associated with this visit and am waiting the final reading from the radiologist.   My preliminary interpretation is as follows: no free air in abdomen    Radiologist interpretation:  CT-ABDOMEN-PELVIS WITH   Final Result         1.  Minimal patchy opacities in bilateral lung bases, appearance suggests subtle infiltrate   2.  Irregular hepatic contour compatible with cirrhosis   3.  Changes of hepatic steatosis   4.  Atherosclerosis and atherosclerotic coronary artery disease   5.  Fat-containing bilateral inguinal hernias   6.  Fat-containing umbilical hernia      DX-CHEST-PORTABLE (1 VIEW)   Final Result         1.  No focal infiltrates.   2.  Perihilar interstitial prominence and bronchial wall cuffing, appearance suggests changes of underlying bronchial inflammation, consider bronchitis.      EC-ECHOCARDIOGRAM  COMPLETE W/O CONT    (Results Pending)       COURSE & MEDICAL DECISION MAKING    ASSESSMENT, COURSE AND PLAN  Care Narrative:   This is a 56-year-old male with history of A-fib, recent cardioversion, alcohol use disorder who presents to the emergency room saying that he feels like he is in alcohol withdrawal as his wife had his alcohol from him.  On arrival, he is mildly tachycardic to 105 but otherwise his vital signs are stable aside from his hypoxia.  He is not normally on oxygen.    An IV was established and blood cultures were obtained.  He does have leukocytosis of 19,700.  There is lactic acidosis to 6.8.  Procalcitonin elevated to 0.55.  Chest x-ray with perihilar interstitial prominence and bronchial wall cuffing and given he is hypoxic, consider pulmonary source of infection therefore he was given ceftriaxone and azithromycin.  UA without signs of infection and his  area was examined and there is no evidence of Lavern's gangrene or any evidence of infection.  Viral swab was all negative.  He did undergo a CT scan of the abdomen given no significant infectious findings seen on workup thus far and he did have some right lower quadrant abdominal pain on exam.  There is no evidence of acute infectious process on the CT scan of his abdomen.    Upon returning from the CT scanner, the patient was noted to be in A-fib with RVR.  He does have a history of this and underwent a cardioversion approximately 2 weeks ago.  He is supposed to be on blood thinner but he is unable to tell me what medicines he takes.  He was not hypotensive and did not require emergent cardioversion.  Given I am concerned that the patient is septic, I decided to treat the underlying cause rather than rate control or perform cardioversion at this time.  I did give him Tylenol empirically although his max temperature was 99.7.    The patient is concerned that he is withdrawing from alcohol.  His alcohol level is just over 200.  He is mildly  tremulous.  Alcohol withdrawal is certainly in the differential.  He did not have a seizure.  I did discuss with intensivist as below he will admit the patient to hospital.  After discussion with intensivist, he recommends starting on amiodarone therefore this was prescribed.    6:38 AM I was alerted that the patient is now tachycardic. I went to bed, he is in atrial fibrillation with RVR. He is not unstable as his BP is normal. He is awake and talking to me.  He will be moved to a different pod.     6:53 AM Patient reevaluated, his MAP is 70. He is still in afib with RVR from 160-190s however I am concerned regarding rate control given I think the culprit of the a-fib is likely sepsis and should treat the underlying cause.     6:59 AM I attempted to call his wife for collateral and to update however she did not answer    7:16 AM Intensivist, Dr. Shukla, now at bedside and recommends placing on amiodarone. Patient reevaluated and states that he is actually feeling somewhat better.  ICU will admit the patient.    CRITICAL CARE  The very real possibilty of a deterioration of this patient's condition required the highest level of my preparedness for sudden, emergent intervention.  I provided critical care services, which included medication orders, frequent reevaluations of the patient's condition and response to treatment, ordering and reviewing test results, and discussing the case with various consultants.  The critical care time associated with the care of the patient was 33 minutes. Review chart for interventions. This time is exclusive of any other billable procedures.       Sepsis: Infection was suspected 440 AM (Time). Sepsis pathway was initiated. 30cc/kg bolus given  Antibiotics were given per protocol.          ADDITIONAL PROBLEMS MANAGED  Sepsis  Alcohol withdrawal  Hypoxia    DISPOSITION AND DISCUSSIONS  I have discussed management of the patient with the following physicians and SANNA's:    Intensivist -   Vazquez    Discussion of management with other [facility] or appropriate source(s): Pharmacy regarding amiodarone      Escalation of care considered, and ultimately not performed: None    Barriers to care at this time, including but not limited to:  None .     Decision tools and prescription drugs considered including, but not limited to: Antibiotics ceftriaxone and azithromycin .    DISPOSITION:  Patient will be hospitalized by Dr. Shukla, intensivist, in critical condition.      FINAL DIAGNOSIS  1. Sepsis with acute organ dysfunction without septic shock, due to unspecified organism, unspecified organ dysfunction type (HCC)    2. Atrial fibrillation with rapid ventricular response (HCC)    3. Acute hypoxic respiratory failure (HCC)    4. Alcohol withdrawal syndrome with complication (HCC)           Electronically signed by: Sangeeta Coats M.D., 4/5/2024 3:43 AM

## 2024-07-02 NOTE — TELEPHONE ENCOUNTER
Other: Ophelia Alas is calling to see if Damien can turn to work and if so is there any restrictions? Please call her [phone]     Could we send this information to you in [employer] or would you prefer to receive a phone call?:   Patient would prefer a phone call   Okay to leave a detailed message?: Yes at Other phone number:  [phone]

## 2024-07-09 ENCOUNTER — TELEPHONE (OUTPATIENT)
Dept: ORTHOPEDICS | Facility: CLINIC | Age: 64
End: 2024-07-09
Payer: COMMERCIAL

## 2024-07-09 DIAGNOSIS — Z98.890 S/P ROTATOR CUFF REPAIR: Primary | ICD-10-CM

## 2024-07-09 NOTE — TELEPHONE ENCOUNTER
Order(s): Other:   Need PO PT orders. Need to know when to start and how often, how many visits  Fax [phone]

## 2024-07-23 NOTE — PROGRESS NOTES
CHIEF COMPLAINT: Status post   1. Right shoulder arthroscopic massive rotator cuff repair with allograft augmentation.   2. Right shoulder arthroscopic glenohumeral debridement, extensive  3. Right shoulder subacromial bursectomy   4. Right shoulder arthroscopic biceps tenotomy  5.  Right shoulder manipulation under anesthesia     DATE OF SURGERY: 6/25/24    HISTORY OF PRESENT ILLNESS: Damien is an extremely pleasant 63 year-old right-hand dominant male who is 6 week status post the above procedure.  He reports minimal pain that comes and goes.  SANE R - 0 L - 100    EXAM:  Pleasant adult 63 year old in no distress.  Respirations even and unlabored.  Right upper extremity: Incisions clean, dry, and intact. No erythema. No drainage. Sens intact Ax/Musc/Med/Rad/Uln nerves. Motor intact EPL, FPL, and Intrinsics. Shoulder range of motion not tested due to postop restrictions.      ASSESSMENT:  1. 6 week status post above procedure    PLAN:  I discussed with patient plan 20 now the sling.  I reemphasized the massive nature of the tear and the need allograft augmentation and the risk for retear and not healing.  Patient has physical therapy scheduled for tomorrow.  I discussed using over-the-counter analgesics and continuing to ice.  I see the patient back another 6 weeks.  Work restrictions were updated today.          Amalia Juan  Orthopedic Surgery Sports Medicine and Shoulder Surgery

## 2024-08-01 ASSESSMENT — ACTIVITIES OF DAILY LIVING (ADL)
WASHING_YOUR_BACK: 4
WASHING_YOUR_HAIR?: 2
PLEASE_INDICATE_YOR_PRIMARY_REASON_FOR_REFERRAL_TO_THERAPY:: SHOULDER
PUTTING_ON_YOUR_PANTS: 5
AT_ITS_WORST?: 2
PUTTING_ON_AN_UNDERSHIRT_OR_A_PULLOVER_SWEATER: 4

## 2024-08-05 ENCOUNTER — OFFICE VISIT (OUTPATIENT)
Dept: ORTHOPEDICS | Facility: CLINIC | Age: 64
End: 2024-08-05
Payer: OTHER MISCELLANEOUS

## 2024-08-05 DIAGNOSIS — Z98.890 S/P ROTATOR CUFF REPAIR: Primary | ICD-10-CM

## 2024-08-05 PROCEDURE — 99024 POSTOP FOLLOW-UP VISIT: CPT | Performed by: ORTHOPAEDIC SURGERY

## 2024-08-05 NOTE — LETTER
[facility] [address] [address]  [phone]          August 5, 2024    RE:  Damien Rasmussen                                                                                                                                                       [address] [address]            To whom it may concern:    Damien Rasmussen is under my professional care for his right shoulder. He may return to work with the following restrictions:  Sedentary desk and telephone work only  No lifting for 6 weeks    Please contact my clinic with questions or concerns.            Sincerely,      Amalia Juan MD

## 2024-08-05 NOTE — LETTER
8/5/2024      Damien Rasmussen  [address] [facility] [address]      Dear Colleague,    Thank you for referring your patient, Damien Rasmussen, to the [facility] [place]. Please see a copy of my visit note below.    CHIEF COMPLAINT: Status post   1. Right shoulder arthroscopic massive rotator cuff repair with allograft augmentation.   2. Right shoulder arthroscopic glenohumeral debridement, extensive  3. Right shoulder subacromial bursectomy   4. Right shoulder arthroscopic biceps tenotomy  5.  Right shoulder manipulation under anesthesia     DATE OF SURGERY: 6/25/24    HISTORY OF PRESENT ILLNESS: Damien is an extremely pleasant 63 year-old right-hand dominant male who is 6 week status post the above procedure.  He reports minimal pain that comes and goes.  SANE R - 0 L - 100    EXAM:  Pleasant adult 63 year old in no distress.  Respirations even and unlabored.  Right upper extremity: Incisions clean, dry, and intact. No erythema. No drainage. Sens intact Ax/Musc/Med/Rad/Uln nerves. Motor intact EPL, FPL, and Intrinsics. Shoulder range of motion not tested due to postop restrictions.      ASSESSMENT:  1. 6 week status post above procedure    PLAN:  I discussed with patient plan 20 now the sling.  I reemphasized the massive nature of the tear and the need allograft augmentation and the risk for retear and not healing.  Patient has physical therapy scheduled for tomorrow.  I discussed using over-the-counter analgesics and continuing to ice.  I see the patient back another 6 weeks.  Work restrictions were updated today.          Amalia Mcnamara  Orthopedic Surgery Sports Medicine and Shoulder Surgery      Again, thank you for allowing me to participate in the care of your patient.        Sincerely,        AMALIA MCNAMARA MD

## 2024-08-05 NOTE — NURSING NOTE
Reason For Visit:   Chief Complaint   Patient presents with    Surgical Followup     Post op follow-up RIGHT ARTHROSCOPY, SHOULDER, WITH MASSIVE  ROTATOR CUFF REPAIR, allograft augmentation, possible bicepstenotomy, subacromial space decompression. DOS: 6/25/24       There were no vitals taken for this visit.    Pain Assessment  Patient Currently in Pain: Yes  0-10 Pain Scale: 2  Primary Pain Location: Shoulder (Right shoulder, forearm and wrist)  Pain Descriptors: Sore, Radiating, Intermittent    Kelly Rendon, ATC

## 2024-08-06 ENCOUNTER — THERAPY VISIT (OUTPATIENT)
Dept: PHYSICAL THERAPY | Facility: CLINIC | Age: 64
End: 2024-08-06
Payer: OTHER MISCELLANEOUS

## 2024-08-06 DIAGNOSIS — M25.511 ACUTE PAIN OF RIGHT SHOULDER: Primary | ICD-10-CM

## 2024-08-06 DIAGNOSIS — Z98.890 S/P ROTATOR CUFF REPAIR: ICD-10-CM

## 2024-08-06 DIAGNOSIS — Z98.890 S/P RIGHT ROTATOR CUFF REPAIR: ICD-10-CM

## 2024-08-06 PROCEDURE — 97161 PT EVAL LOW COMPLEX 20 MIN: CPT | Mod: GP | Performed by: PHYSICAL THERAPIST

## 2024-08-06 PROCEDURE — 97010 HOT OR COLD PACKS THERAPY: CPT | Mod: GP | Performed by: PHYSICAL THERAPIST

## 2024-08-06 PROCEDURE — 97110 THERAPEUTIC EXERCISES: CPT | Mod: GP | Performed by: PHYSICAL THERAPIST

## 2024-08-06 NOTE — PROGRESS NOTES
PHYSICAL THERAPY EVALUATION  Type of Visit: Evaluation              Subjective       Presenting condition or subjective complaint:   Patient underwent right rotator cuff massive tear repair with allograft augmentation on 6/25/24.  He dislocated his shoulder at work on 4/22/24.  Date of onset: 06/25/24    Relevant medical history: High blood pressure   Dates & types of surgery: june 25 2024  rotator cuff repair    Prior diagnostic imaging/testing results: MRI; X-ray     Prior therapy history for the same diagnosis, illness or injury: Yes june 2024    Prior Level of Function  Transfers: Independent  Ambulation: Independent  ADL: Independent  IADL: Driving, Housekeeping, Laundry, Meal preparation, Work, Yard work    Living Environment  Social support: With a significant other or spouse   Type of home: House   Stairs to enter the home: No       Ramp: No   Stairs inside the home: Yes 9 Is there a railing: Yes     Help at home: None  Equipment owned:       Employment: Yes   Hobbies/Interests:      Patient goals for therapy:      Pain assessment: Pain present     Objective   SHOULDER EVALUATION  PAIN: Pain Level at Rest: 2/10  Pain Level with Use: 5/10  Pain Location: shoulder  Pain Quality: Aching, Dull, and Sharp  Pain Frequency: intermittent  Pain is Worst: daytime  Pain is Exacerbated By: reaching too far or trying to use right arm for ADL's  Pain is Relieved By: cold and rest  Pain Progression: Improved  INTEGUMENTARY (edema, incisions):  Surgical incisions appear clean and well-healed  POSTURE:   GAIT:   Weightbearing Status:   Assistive Device(s):   Gait Deviations:   BALANCE/PROPRIOCEPTION:   WEIGHTBEARING ALIGNMENT:   ROM:   (Degrees) Left AROM Left PROM Right AROM  Right PROM   Shoulder Flexion 170   97   Shoulder Abduction 170   80   Shoulder Internal Rotation T9   45   Shoulder External Rotation 75   0     STRENGTH:  LEFT SHOULDER strength grossly 5/5 in all planes, right shoulder strength  testing not performed  FLEXIBILITY:   SPECIAL TESTS:   PALPATION:  mild tenderness about right shoulder at surgical incision scars, anterior and posterior shoulder muscle  JOINT MOBILITY:   CERVICAL SCREEN:     Assessment & Plan   CLINICAL IMPRESSIONS  Medical Diagnosis: s/p right rotator cuff repair; right shoulder pain    Treatment Diagnosis: s/p right rotator cuff repair; right shoulder pain   Impression/Assessment: Patient is a 63 year old male with right shoulder complaints.  The following significant findings have been identified: Pain, Decreased ROM/flexibility, Decreased joint mobility, Decreased strength, Decreased proprioception, and Decreased activity tolerance. These impairments interfere with their ability to perform self care tasks, work tasks, recreational activities, and household chores as compared to previous level of function.     Clinical Decision Making (Complexity):  Clinical Presentation: Stable/Uncomplicated  Clinical Presentation Rationale: based on medical and personal factors listed in PT evaluation  Clinical Decision Making (Complexity): Low complexity    PLAN OF CARE  Treatment Interventions:  Modalities: Cryotherapy, Hot Pack  Interventions: Manual Therapy, Neuromuscular Re-education, Therapeutic Activity, Therapeutic Exercise, Self-Care/Home Management    Long Term Goals     PT Goal 1  Goal Identifier: Self cares  Goal Description: Patient will be able to perform all self cares using right arm with 0-1/10 pain.  Rationale: to maximize safety and independence with performance of ADLs and functional tasks;to maximize safety and independence within the home;to maximize safety and independence within the community;to maximize safety and independence with self cares  Goal Progress: unable to use right arm away from body or behind back or overhead  Target Date: 10/29/24      Frequency of Treatment: 1x/week  Duration of Treatment: 12 weeks    Recommended Referrals to Other Professionals:   none  Education Assessment:   Learner/Method: Patient;Pictures/Video    Risks and benefits of evaluation/treatment have been explained.   Patient/Family/caregiver agrees with Plan of Care.     Evaluation Time:     PT Eval, Low Complexity Minutes (70789): 12       Signing Clinician: Magdi Conti PT

## 2024-08-13 ENCOUNTER — THERAPY VISIT (OUTPATIENT)
Dept: PHYSICAL THERAPY | Facility: CLINIC | Age: 64
End: 2024-08-13
Payer: OTHER MISCELLANEOUS

## 2024-08-13 DIAGNOSIS — Z98.890 S/P RIGHT ROTATOR CUFF REPAIR: ICD-10-CM

## 2024-08-13 DIAGNOSIS — M25.511 ACUTE PAIN OF RIGHT SHOULDER: Primary | ICD-10-CM

## 2024-08-13 PROCEDURE — 97110 THERAPEUTIC EXERCISES: CPT | Mod: GP | Performed by: PHYSICAL THERAPIST

## 2024-08-13 PROCEDURE — 97010 HOT OR COLD PACKS THERAPY: CPT | Mod: GP | Performed by: PHYSICAL THERAPIST

## 2024-08-20 ENCOUNTER — THERAPY VISIT (OUTPATIENT)
Dept: PHYSICAL THERAPY | Facility: CLINIC | Age: 64
End: 2024-08-20
Payer: OTHER MISCELLANEOUS

## 2024-08-20 DIAGNOSIS — M25.511 ACUTE PAIN OF RIGHT SHOULDER: Primary | ICD-10-CM

## 2024-08-20 DIAGNOSIS — Z98.890 S/P RIGHT ROTATOR CUFF REPAIR: ICD-10-CM

## 2024-08-20 PROCEDURE — 97110 THERAPEUTIC EXERCISES: CPT | Mod: GP | Performed by: PHYSICAL THERAPIST

## 2024-08-27 ENCOUNTER — THERAPY VISIT (OUTPATIENT)
Dept: PHYSICAL THERAPY | Facility: CLINIC | Age: 64
End: 2024-08-27
Payer: OTHER MISCELLANEOUS

## 2024-08-27 DIAGNOSIS — Z98.890 S/P RIGHT ROTATOR CUFF REPAIR: ICD-10-CM

## 2024-08-27 DIAGNOSIS — M25.511 ACUTE PAIN OF RIGHT SHOULDER: Primary | ICD-10-CM

## 2024-08-27 PROCEDURE — 97110 THERAPEUTIC EXERCISES: CPT | Mod: GP | Performed by: PHYSICAL THERAPIST

## 2024-08-27 PROCEDURE — 97140 MANUAL THERAPY 1/> REGIONS: CPT | Mod: GP | Performed by: PHYSICAL THERAPIST

## 2024-09-05 ENCOUNTER — THERAPY VISIT (OUTPATIENT)
Dept: PHYSICAL THERAPY | Facility: CLINIC | Age: 64
End: 2024-09-05
Payer: OTHER MISCELLANEOUS

## 2024-09-05 DIAGNOSIS — M25.511 ACUTE PAIN OF RIGHT SHOULDER: Primary | ICD-10-CM

## 2024-09-05 DIAGNOSIS — Z98.890 S/P RIGHT ROTATOR CUFF REPAIR: ICD-10-CM

## 2024-09-05 PROCEDURE — 97110 THERAPEUTIC EXERCISES: CPT | Mod: GP | Performed by: PHYSICAL THERAPIST

## 2024-09-05 PROCEDURE — 97140 MANUAL THERAPY 1/> REGIONS: CPT | Mod: GP | Performed by: PHYSICAL THERAPIST

## 2024-09-11 NOTE — PROGRESS NOTES
CHIEF COMPLAINT: Status post   1. Right shoulder arthroscopic massive rotator cuff repair with allograft augmentation.   2. Right shoulder arthroscopic glenohumeral debridement, extensive  3. Right shoulder subacromial bursectomy   4. Right shoulder arthroscopic biceps tenotomy  5.  Right shoulder manipulation under anesthesia     DATE OF SURGERY: 6/25/24    HISTORY OF PRESENT ILLNESS: Damien is an extremely pleasant 63 year-old right-hand dominant male who is 3 month status post the above procedure.  He reports minimal pain when he does over the shoulder activities  SANE R - 70 L - 100    EXAM:  Pleasant adult 63 year old in no distress.  Respirations even and unlabored.  Right upper extremity: Incision healed. No erythema. No drainage. Sens intact Ax/Musc/Med/Rad/Uln nerves. Motor intact EPL, FPL, and Intrinsics.  Active forward elevation 130 degrees, abduction 100 degrees tenderness rotation of 20 degrees.  Does have some diffuse weakness in the rotator cuff    ASSESSMENT:  1. 3 month status post above procedure    PLAN:  Patient's QRC was present for the duration of this encounter.  I discussed with the patient that he does have some stiffness, most concerned that his rotator cuff may have not healed as he does have some weakness.  However his pain is substantially improved.  We discussed continue to work with physical therapy and doing home exercises to try to get as much out of the shoulder as possible.  Given the massive nature of the tear and the allograft augmentation we discussed the high risk of not healing, at this point he would not be candidate for any kind of revision repair so we are going to try to optimize his physical therapy is much as possible.  We discussed if repeat imaging needed that we wait to 6 months for reassessment, again I cannot further treatment with depend on his symptoms.  Updated work restrictions were given today.  He will follow-up in another 2 to 3 months.      Amalia DARBY  [facility]  Orthopedic Surgery Sports Medicine and Shoulder Surgery

## 2024-09-12 ENCOUNTER — THERAPY VISIT (OUTPATIENT)
Dept: PHYSICAL THERAPY | Facility: CLINIC | Age: 64
End: 2024-09-12
Payer: OTHER MISCELLANEOUS

## 2024-09-12 DIAGNOSIS — Z98.890 S/P RIGHT ROTATOR CUFF REPAIR: ICD-10-CM

## 2024-09-12 DIAGNOSIS — M25.511 ACUTE PAIN OF RIGHT SHOULDER: Primary | ICD-10-CM

## 2024-09-12 PROCEDURE — 97140 MANUAL THERAPY 1/> REGIONS: CPT | Mod: GP | Performed by: PHYSICAL THERAPIST

## 2024-09-12 PROCEDURE — 97110 THERAPEUTIC EXERCISES: CPT | Mod: GP | Performed by: PHYSICAL THERAPIST

## 2024-09-12 NOTE — Clinical Note
Damien Brand is progressing well to this point, now nearing 12 weeks s/p right RC repair.  His PROM after stretching is: flexion 160 abduction 160 ER 70 IR 50.  His AROM is not as good yet but I expect it to improve once as we progress his cuff strengthening and scapular stabilization.  I do not have any concerns on my end but please let me know if you have any concerns or questions for me.  Thanks!  Livan Conti, RADHAT, SCS  [employer] and ANTONI MCNALLY

## 2024-09-12 NOTE — PROGRESS NOTES
09/12/24 0500   Appointment Info   Signing clinician's name / credentials Livan Asuma, DPT, SCS   Total/Authorized Visits 12   Visits Used 5   Medical Diagnosis s/p right rotator cuff repair; right shoulder pain   PT Tx Diagnosis s/p right rotator cuff repair; right shoulder pain   Progress Note/Certification   Onset of illness/injury or Date of Surgery 06/25/24   Therapy Frequency 1x/week   Predicted Duration 12 weeks   Progress Note Due Date 10/01/24   PT Goal 1   Goal Identifier Self cares   Goal Description Patient will be able to perform all self cares using right arm with 0-1/10 pain.   Rationale to maximize safety and independence with performance of ADLs and functional tasks;to maximize safety and independence within the home;to maximize safety and independence within the community;to maximize safety and independence with self cares   Goal Progress unable to use right arm away from body or behind back or overhead   Target Date 10/29/24   Subjective Report   Subjective Report Damien reports his shoulder is coming along well, starting to feel more normal and not aching all the time.  He does not have strength or full motion/use of it but he is more comfortable and able to do more with the right arm below shoulder level.   Objective Measures   Objective Measures Objective Measure 2   Objective Measure 1   Objective Measure ROM   Details R shoulder AROM: flexion to 95 with shoulder shrug, abduction 85 with shrug ER 65  IR/EXT to L4   Objective Measure 2   Objective Measure PROM   Details Right shoulder PROM: flexion 160 abduction 160 ER 70  ER 50   Treatment Interventions (PT)   Interventions Therapeutic Procedure/Exercise;Manual Therapy   Therapeutic Procedure/Exercise   Therapeutic Procedures: strength, endurance, ROM, flexibility minutes (03661) 30   Ther Proc 1 pendulums   Ther Proc 1 - Details x30 all direction   Ther Proc 2 pulleys   Ther Proc 2 - Details x2 min each flexion and scaption   Ther Proc 5  blue band row   Ther Proc 5 - Details x25-30   Ther Proc 6 green band pull down   Ther Proc 6 - Details x20-25   Ther Proc 7 sidelying shoulder horizontal abduction   Ther Proc 7 - Details ag x10   Ther Proc 9 supine ceiling punch and reverse pendulums   Ther Proc 9 - Details 6 oz x20-25; 6 oz x20-25 each   Ther Proc 10 SL shoulder ER   Ther Proc 10 - Details ag x25-30   Manual Therapy   Manual Therapy: Mobilization, MFR, MLD, friction massage minutes (46547) 15   Manual Therapy 1 STM to right posterior shoulder and pec and teres/subscap   Manual Therapy 1 - Details x10 min   Skilled Intervention manual skills   Patient Response/Progress increased tissue and joint mobility   Manual Therapy 2 GH mobs Gr III-IV   Manual Therapy 2 - Details x5 min   Manual Therapy Manual Therapy 2   Education   Learner/Method Patient;Pictures/Video   Plan   Plan for next session prone scapula   Total Session Time   Timed Code Treatment Minutes 45   Total Treatment Time (sum of timed and untimed services) 45         PLAN  Continue therapy per current plan of care.    Beginning/End Dates of Progress Note Reporting Period:   08/06/2024 to 09/12/2024    Referring Provider:  Amalia Juan

## 2024-09-18 ENCOUNTER — OFFICE VISIT (OUTPATIENT)
Dept: ORTHOPEDICS | Facility: CLINIC | Age: 64
End: 2024-09-18
Payer: OTHER MISCELLANEOUS

## 2024-09-18 DIAGNOSIS — Z98.890 S/P ROTATOR CUFF REPAIR: Primary | ICD-10-CM

## 2024-09-18 PROCEDURE — 99024 POSTOP FOLLOW-UP VISIT: CPT | Performed by: ORTHOPAEDIC SURGERY

## 2024-09-18 PROCEDURE — 99199 UNLISTED SPECIAL SVC PX/RPRT: CPT | Performed by: ORTHOPAEDIC SURGERY

## 2024-09-18 RX ORDER — METHYLPREDNISOLONE 4 MG
TABLET, DOSE PACK ORAL
Qty: 21 TABLET | Refills: 0 | Status: SHIPPED | OUTPATIENT
Start: 2024-09-18

## 2024-09-18 NOTE — LETTER
9/18/2024      Damien Rasmussen  [address] [address]      Dear Colleague,    Thank you for referring your patient, Damien Rasmussen, to the [facility] [place]. Please see a copy of my visit note below.    CHIEF COMPLAINT: Status post   1. Right shoulder arthroscopic massive rotator cuff repair with allograft augmentation.   2. Right shoulder arthroscopic glenohumeral debridement, extensive  3. Right shoulder subacromial bursectomy   4. Right shoulder arthroscopic biceps tenotomy  5.  Right shoulder manipulation under anesthesia     DATE OF SURGERY: 6/25/24    HISTORY OF PRESENT ILLNESS: Damien is an extremely pleasant 63 year-old right-hand dominant male who is 3 month status post the above procedure.  He reports minimal pain when he does over the shoulder activities  SANE R - 70 L - 100    EXAM:  Pleasant adult 63 year old in no distress.  Respirations even and unlabored.  Right upper extremity: Incision healed. No erythema. No drainage. Sens intact Ax/Musc/Med/Rad/Uln nerves. Motor intact EPL, FPL, and Intrinsics.  Active forward elevation 130 degrees, abduction 100 degrees tenderness rotation of 20 degrees.  Does have some diffuse weakness in the rotator cuff    ASSESSMENT:  1. 3 month status post above procedure    PLAN:  Patient's QRC was present for the duration of this encounter.  I discussed with the patient that he does have some stiffness, most concerned that his rotator cuff may have not healed as he does have some weakness.  However his pain is substantially improved.  We discussed continue to work with physical therapy and doing home exercises to try to get as much out of the shoulder as possible.  Given the massive nature of the tear and the allograft augmentation we discussed the high risk of not healing, at this point he would not be candidate for any kind of revision repair so we are going to try to optimize his physical therapy is much as possible.  We  discussed if repeat imaging needed that we wait to 6 months for reassessment, again I cannot further treatment with depend on his symptoms.  Updated work restrictions were given today.  He will follow-up in another 2 to 3 months.      Amalia Mcnamara  Orthopedic Surgery Sports Medicine and Shoulder Surgery      Again, thank you for allowing me to participate in the care of your patient.        Sincerely,        AMALIA MCNAMARA MD

## 2024-09-18 NOTE — LETTER
2024     Seen today: Yes    Patient:  Damien Rasmussen  :   1960  MRN:     [id number]  Physician: ED MCNAMARA BHAVYA Rasmussen may return to work with the following restrictions for the right upper extremity:  Ok to carry at side no more than 5 pounds  OK to lift to chest height no more than 1 pound  No overhead activities   No push or pull activities      The next clinic appointment is scheduled for (date/time) 24.    Please call the clinic with any questions,      Electronically signed by ED MCNAMARA MD

## 2024-09-19 ENCOUNTER — THERAPY VISIT (OUTPATIENT)
Dept: PHYSICAL THERAPY | Facility: CLINIC | Age: 64
End: 2024-09-19
Payer: OTHER MISCELLANEOUS

## 2024-09-19 DIAGNOSIS — Z98.890 S/P RIGHT ROTATOR CUFF REPAIR: ICD-10-CM

## 2024-09-19 DIAGNOSIS — M25.511 ACUTE PAIN OF RIGHT SHOULDER: Primary | ICD-10-CM

## 2024-09-19 PROCEDURE — 97110 THERAPEUTIC EXERCISES: CPT | Mod: GP | Performed by: PHYSICAL THERAPIST

## 2024-09-26 ENCOUNTER — THERAPY VISIT (OUTPATIENT)
Dept: PHYSICAL THERAPY | Facility: CLINIC | Age: 64
End: 2024-09-26
Payer: OTHER MISCELLANEOUS

## 2024-09-26 DIAGNOSIS — M25.511 ACUTE PAIN OF RIGHT SHOULDER: Primary | ICD-10-CM

## 2024-09-26 DIAGNOSIS — Z98.890 S/P RIGHT ROTATOR CUFF REPAIR: ICD-10-CM

## 2024-09-26 PROCEDURE — 97110 THERAPEUTIC EXERCISES: CPT | Mod: GP | Performed by: PHYSICAL THERAPIST

## 2024-09-26 PROCEDURE — 97140 MANUAL THERAPY 1/> REGIONS: CPT | Mod: GP | Performed by: PHYSICAL THERAPIST

## 2024-10-01 ENCOUNTER — THERAPY VISIT (OUTPATIENT)
Dept: PHYSICAL THERAPY | Facility: CLINIC | Age: 64
End: 2024-10-01
Payer: OTHER MISCELLANEOUS

## 2024-10-01 DIAGNOSIS — M25.511 ACUTE PAIN OF RIGHT SHOULDER: Primary | ICD-10-CM

## 2024-10-01 DIAGNOSIS — Z98.890 S/P RIGHT ROTATOR CUFF REPAIR: ICD-10-CM

## 2024-10-01 PROCEDURE — 97140 MANUAL THERAPY 1/> REGIONS: CPT | Mod: GP | Performed by: PHYSICAL THERAPIST

## 2024-10-01 PROCEDURE — 97110 THERAPEUTIC EXERCISES: CPT | Mod: GP | Performed by: PHYSICAL THERAPIST

## 2024-10-04 NOTE — PROGRESS NOTES
CHIEF COMPLAINT: Status post   1. Right shoulder arthroscopic massive rotator cuff repair with allograft augmentation.   2. Right shoulder arthroscopic glenohumeral debridement, extensive  3. Right shoulder subacromial bursectomy   4. Right shoulder arthroscopic biceps tenotomy  5.  Right shoulder manipulation under anesthesia     DATE OF SURGERY: 6/25/24    HISTORY OF PRESENT ILLNESS: Damien is an extremely pleasant 63 year-old right-hand dominant male who is 4 month status post the above procedure.  He reports minimal pain when he does over the out to the side activities SANE R - 90 L - 100    EXAM:  Pleasant adult 63 year old in no distress.  Respirations even and unlabored.  Right upper extremity: Incision healed. No erythema. No drainage. Sens intact Ax/Musc/Med/Rad/Uln nerves. Motor intact EPL, FPL, and Intrinsics.  Active forward elevation 150 degrees, abduction 140 degrees, external rotation of 40 degrees.   Does have some diffuse weakness in the rotator cuff    ASSESSMENT:  1. 4 month status post above procedure    PLAN:  Patient's QRC was present for the duration of this encounter.  I discussed with the patient that his stiffness is really improved, he is no longer compensating with shoulder girdle hiking.  He does have some weakness in his rotator cuff and we discussed starting to work more on strengthening and physical therapy.  Again discussed given the massive nature of the tear and the allograft augmentation the chance that he does not heal is quite high however at this point we are going to base this off symptomatology and he really has minimal symptoms of pain, is regaining good range of motion and is going to start strengthening.  His work restrictions were updated to the chest.  To 10 pounds, no repetitive push pull, no overhead work.  Follow-up in 6 weeks.        Amalia Juan  Orthopedic Surgery Sports Medicine and Shoulder Surgery

## 2024-10-08 ENCOUNTER — THERAPY VISIT (OUTPATIENT)
Dept: PHYSICAL THERAPY | Facility: CLINIC | Age: 64
End: 2024-10-08
Payer: OTHER MISCELLANEOUS

## 2024-10-08 DIAGNOSIS — M25.511 ACUTE PAIN OF RIGHT SHOULDER: Primary | ICD-10-CM

## 2024-10-08 DIAGNOSIS — Z98.890 S/P RIGHT ROTATOR CUFF REPAIR: ICD-10-CM

## 2024-10-08 PROCEDURE — 97110 THERAPEUTIC EXERCISES: CPT | Mod: GP | Performed by: PHYSICAL THERAPIST

## 2024-10-08 PROCEDURE — 97140 MANUAL THERAPY 1/> REGIONS: CPT | Mod: GP | Performed by: PHYSICAL THERAPIST

## 2024-10-15 ENCOUNTER — THERAPY VISIT (OUTPATIENT)
Dept: PHYSICAL THERAPY | Facility: CLINIC | Age: 64
End: 2024-10-15
Payer: OTHER MISCELLANEOUS

## 2024-10-15 DIAGNOSIS — Z98.890 S/P RIGHT ROTATOR CUFF REPAIR: ICD-10-CM

## 2024-10-15 DIAGNOSIS — M25.511 ACUTE PAIN OF RIGHT SHOULDER: Primary | ICD-10-CM

## 2024-10-15 PROCEDURE — 97140 MANUAL THERAPY 1/> REGIONS: CPT | Mod: GP | Performed by: PHYSICAL THERAPIST

## 2024-10-15 PROCEDURE — 97110 THERAPEUTIC EXERCISES: CPT | Mod: GP | Performed by: PHYSICAL THERAPIST

## 2024-10-15 NOTE — Clinical Note
Damien Brand will follow up with you tomorrow (10/16), no about 16 weeks s/p right cuff repair.  Damien reports his shoulder feels great with little to no pain at this point.  He feels like he is making good progress with his motion, strength, and function.  He is pretty weak yet but he is now able to actively reach above shoulder level with minimal shrug.  His AROM does not yet match his PROM and his strength is grossly about a 3+/5 to 4-/5 but showing good progress over the past few weeks.  I feel like his capsular tightness was limiting him significantly.  We've worked a lot on this in clinic and his home program has focused mainly on cuff strength and scapular stability.  I like the progress he's making and am confident he'll get where he needs to be.  Please let me know if you have any questions or concerns for me.  Thanks!  Livan Conti, DPT, [facility] and ANTONI MCNALLY

## 2024-10-15 NOTE — PROGRESS NOTES
"   10/15/24 0500   Appointment Info   Signing clinician's name / credentials Livan Asuma, DPT, SCS   Total/Authorized Visits 12   Visits Used 9   Medical Diagnosis s/p right rotator cuff repair; right shoulder pain   PT Tx Diagnosis s/p right rotator cuff repair; right shoulder pain   Progress Note/Certification   Onset of illness/injury or Date of Surgery 06/25/24   Therapy Frequency 1x/week   Predicted Duration 12 weeks   Progress Note Due Date 10/01/24   Progress Note Completed Date 09/12/24   PT Goal 1   Goal Identifier Self cares   Goal Description Patient will be able to perform all self cares using right arm with 0-1/10 pain.   Rationale to maximize safety and independence with performance of ADLs and functional tasks;to maximize safety and independence within the home;to maximize safety and independence within the community;to maximize safety and independence with self cares   Goal Progress can reach to upper cabinet now with right arm   Target Date 10/29/24   Subjective Report   Subjective Report Damien reports his shoulder is feeling really good with minimal to no pain.  He feels like his motion and strength are coming along well and he is gaining more function with the right arm.  He notes it is still weak and his motion is not fully back yet but it is getting much better.   Objective Measure 1   Objective Measure ROM   Details Right shoulder AROM: flexion 120 scaption 100  ER 55 IR/EXT to L1-2   Objective Measure 2   Objective Measure Strength   Details Right shoulder strength:  flexion 4-/5  abduction 3+/5  ER 4-/5  IR 4+/5   Treatment Interventions (PT)   Interventions Therapeutic Procedure/Exercise;Manual Therapy   Therapeutic Procedure/Exercise   Therapeutic Procedures: strength, endurance, ROM, flexibility minutes (07921) 25   Ther Proc 1 UBE   Ther Proc 1 - Details x5 min   Ther Proc 2 PROM with scapular stabilization   Ther Proc 2 - Details flexion and abduction x10 with 10\" hold   Ther Proc 3 " "sleeper stretch   Ther Proc 3 - Details 15\" hold x5   Ther Proc 6 prone shoulder extension   Ther Proc 6 - Details ag x20   Ther Proc 7 prone shoulder horizontal abduction   Ther Proc 7 - Details x20   Ther Proc 9 4-corner stretch   Ther Proc 9 - Details flexion with lift off x10, with lift off and eccentric abduction/scaption x10   Ther Proc 10 SL shoulder ER   Ther Proc 10 - Details ag x30-40   Skilled Intervention exercise selection and instruction, PROM   Patient Response/Progress increased ROM, no pain   Manual Therapy   Manual Therapy: Mobilization, MFR, MLD, friction massage minutes (10092) 15   Manual Therapy 1 STM to upper traps,pec minor, posterior shoulder   Manual Therapy 1 - Details x7 min   Manual Therapy 2 GH mobs Gr III   Manual Therapy 2 - Details x8 min   Skilled Intervention manual skills   Patient Response/Progress decreased tension/tone in upper traps/posteriro shoulder   Education   Learner/Method Patient;Pictures/Video   Plan   Plan for next session progress with cuff strength, continue ROM/capsular mobility   Total Session Time   Timed Code Treatment Minutes 40   Total Treatment Time (sum of timed and untimed services) 40         PLAN  Continue therapy per current plan of care.    Beginning/End Dates of Progress Note Reporting Period:  09/12/24 to 10/15/2024    Referring Provider:  Amalia Juan    "

## 2024-10-16 ENCOUNTER — OFFICE VISIT (OUTPATIENT)
Dept: ORTHOPEDICS | Facility: CLINIC | Age: 64
End: 2024-10-16
Payer: OTHER MISCELLANEOUS

## 2024-10-16 DIAGNOSIS — Z98.890 S/P ROTATOR CUFF REPAIR: Primary | ICD-10-CM

## 2024-10-16 PROCEDURE — 99212 OFFICE O/P EST SF 10 MIN: CPT | Performed by: ORTHOPAEDIC SURGERY

## 2024-10-16 PROCEDURE — 99199 UNLISTED SPECIAL SVC PX/RPRT: CPT | Performed by: ORTHOPAEDIC SURGERY

## 2024-10-16 NOTE — LETTER
10/16/2024      Damien Rasmussen  [address] [address]      Dear Colleague,    Thank you for referring your patient, Damien Rasmussen, to the [facility] [place]. Please see a copy of my visit note below.    CHIEF COMPLAINT: Status post   1. Right shoulder arthroscopic massive rotator cuff repair with allograft augmentation.   2. Right shoulder arthroscopic glenohumeral debridement, extensive  3. Right shoulder subacromial bursectomy   4. Right shoulder arthroscopic biceps tenotomy  5.  Right shoulder manipulation under anesthesia     DATE OF SURGERY: 6/25/24    HISTORY OF PRESENT ILLNESS: Damien is an extremely pleasant 63 year-old right-hand dominant male who is 4 month status post the above procedure.  He reports minimal pain when he does over the out to the side activities SANE R - 90 L - 100    EXAM:  Pleasant adult 63 year old in no distress.  Respirations even and unlabored.  Right upper extremity: Incision healed. No erythema. No drainage. Sens intact Ax/Musc/Med/Rad/Uln nerves. Motor intact EPL, FPL, and Intrinsics.  Active forward elevation 150 degrees, abduction 140 degrees, external rotation of 40 degrees.   Does have some diffuse weakness in the rotator cuff    ASSESSMENT:  1. 4 month status post above procedure    PLAN:  Patient's QRC was present for the duration of this encounter.  I discussed with the patient that his stiffness is really improved, he is no longer compensating with shoulder girdle hiking.  He does have some weakness in his rotator cuff and we discussed starting to work more on strengthening and physical therapy.  Again discussed given the massive nature of the tear and the allograft augmentation the chance that he does not heal is quite high however at this point we are going to base this off symptomatology and he really has minimal symptoms of pain, is regaining good range of motion and is going to start strengthening.  His work restrictions  were updated to the chest.  To 10 pounds, no repetitive push pull, no overhead work.  Follow-up in 6 weeks.        Amalia Mcnamara  Orthopedic Surgery Sports Medicine and Shoulder Surgery      Again, thank you for allowing me to participate in the care of your patient.        Sincerely,        AMALIA MCNAMARA MD

## 2024-10-16 NOTE — LETTER
2024     Seen today: Yes    Patient:  Damien Rasmussen  :   1960  MRN:     [id number]  Physician: ED MCNAMARA BHAVYA Rasmussen may return to work with the following restrictions:  OK to lift up to 10 pounds to chest height with the right upper extremity  No lifting over head   No repetitive push pull activities with the right upper extremity      The next clinic appointment is scheduled for (date/time) 6 weeks.    Please call the clinic with any questions.      Electronically signed by ED MCNAMARA MD

## 2024-10-22 ENCOUNTER — THERAPY VISIT (OUTPATIENT)
Dept: PHYSICAL THERAPY | Facility: CLINIC | Age: 64
End: 2024-10-22
Payer: OTHER MISCELLANEOUS

## 2024-10-22 DIAGNOSIS — Z98.890 S/P RIGHT ROTATOR CUFF REPAIR: ICD-10-CM

## 2024-10-22 DIAGNOSIS — M25.511 ACUTE PAIN OF RIGHT SHOULDER: Primary | ICD-10-CM

## 2024-10-22 PROCEDURE — 97110 THERAPEUTIC EXERCISES: CPT | Mod: GP | Performed by: PHYSICAL THERAPIST

## 2024-10-22 PROCEDURE — 97140 MANUAL THERAPY 1/> REGIONS: CPT | Mod: GP | Performed by: PHYSICAL THERAPIST

## 2024-10-29 ENCOUNTER — THERAPY VISIT (OUTPATIENT)
Dept: PHYSICAL THERAPY | Facility: CLINIC | Age: 64
End: 2024-10-29
Payer: OTHER MISCELLANEOUS

## 2024-10-29 DIAGNOSIS — M25.511 ACUTE PAIN OF RIGHT SHOULDER: Primary | ICD-10-CM

## 2024-10-29 DIAGNOSIS — Z98.890 S/P RIGHT ROTATOR CUFF REPAIR: ICD-10-CM

## 2024-10-29 PROCEDURE — 97140 MANUAL THERAPY 1/> REGIONS: CPT | Mod: GP | Performed by: PHYSICAL THERAPIST

## 2024-10-29 PROCEDURE — 97110 THERAPEUTIC EXERCISES: CPT | Mod: GP | Performed by: PHYSICAL THERAPIST

## 2024-11-14 ENCOUNTER — THERAPY VISIT (OUTPATIENT)
Dept: PHYSICAL THERAPY | Facility: CLINIC | Age: 64
End: 2024-11-14
Payer: OTHER MISCELLANEOUS

## 2024-11-14 DIAGNOSIS — Z98.890 S/P RIGHT ROTATOR CUFF REPAIR: ICD-10-CM

## 2024-11-14 DIAGNOSIS — M25.511 ACUTE PAIN OF RIGHT SHOULDER: Primary | ICD-10-CM

## 2024-11-14 PROCEDURE — 97140 MANUAL THERAPY 1/> REGIONS: CPT | Mod: GP | Performed by: PHYSICAL THERAPIST

## 2024-11-14 PROCEDURE — 97110 THERAPEUTIC EXERCISES: CPT | Mod: GP | Performed by: PHYSICAL THERAPIST

## 2024-11-15 ENCOUNTER — TELEPHONE (OUTPATIENT)
Dept: ORTHOPEDICS | Facility: CLINIC | Age: 64
End: 2024-11-15
Payer: COMMERCIAL

## 2024-11-26 ENCOUNTER — THERAPY VISIT (OUTPATIENT)
Dept: PHYSICAL THERAPY | Facility: CLINIC | Age: 64
End: 2024-11-26
Payer: OTHER MISCELLANEOUS

## 2024-11-26 DIAGNOSIS — Z98.890 S/P RIGHT ROTATOR CUFF REPAIR: ICD-10-CM

## 2024-11-26 DIAGNOSIS — M25.511 ACUTE PAIN OF RIGHT SHOULDER: Primary | ICD-10-CM

## 2024-11-26 PROCEDURE — 97140 MANUAL THERAPY 1/> REGIONS: CPT | Mod: GP | Performed by: PHYSICAL THERAPIST

## 2024-11-26 PROCEDURE — 97110 THERAPEUTIC EXERCISES: CPT | Mod: GP | Performed by: PHYSICAL THERAPIST

## 2024-12-03 NOTE — PROGRESS NOTES
CHIEF COMPLAINT: Status post   1. Right shoulder arthroscopic massive rotator cuff repair with allograft augmentation.   2. Right shoulder arthroscopic glenohumeral debridement, extensive  3. Right shoulder subacromial bursectomy   4. Right shoulder arthroscopic biceps tenotomy  5.  Right shoulder manipulation under anesthesia     DATE OF SURGERY: 6/25/24    HISTORY OF PRESENT ILLNESS: Damien is an extremely pleasant 63 year-old right-hand dominant male who is 6 month status post the above procedure.  He reports minimal pain when he does over the out to the side activities SANE R - 85 L - 100    EXAM:  Pleasant adult 63 year old in no distress.  Respirations even and unlabored.  Right upper extremity: Incision healed. No erythema. No drainage. Sens intact Ax/Musc/Med/Rad/Uln nerves. Motor intact EPL, FPL, and Intrinsics.  Active forward elevation 150 degrees, abduction 140 degrees, external rotation of 40 degrees.   Does have some diffuse weakness in the rotator cuff    ASSESSMENT:  1. 6 month status post above procedure    PLAN:  Patient's QRC was present for the duration of this encounter.  I again discussed with the patient that he does have weakness in his rotator cuff, there is a high chance of the rotator cuff did not heal given the massive nature of the tear as well as allograft augmentation.  However he is minimally symptomatic we discussed that we repair would not be feasible also at this point were going to continue to follow his symptoms which are quite minimal.  He is going to continue to strengthen with physical therapy.  His work restrictions were updated to 5 pounds overhead with no repetitive overhead work, 20 pounds to the chest and waist.  I will follow-up in another 6 weeks.  I anticipate return to full duty closer to 9 months.         Amalia Juan  Orthopedic Surgery Sports Medicine and Shoulder Surgery

## 2024-12-04 ENCOUNTER — OFFICE VISIT (OUTPATIENT)
Dept: ORTHOPEDICS | Facility: CLINIC | Age: 64
End: 2024-12-04
Payer: OTHER MISCELLANEOUS

## 2024-12-04 DIAGNOSIS — Z98.890 S/P ROTATOR CUFF REPAIR: Primary | ICD-10-CM

## 2024-12-04 PROCEDURE — 99199 UNLISTED SPECIAL SVC PX/RPRT: CPT | Performed by: ORTHOPAEDIC SURGERY

## 2024-12-04 PROCEDURE — 99213 OFFICE O/P EST LOW 20 MIN: CPT | Performed by: ORTHOPAEDIC SURGERY

## 2024-12-04 NOTE — LETTER
2024     Seen today: Yes    Patient:  Damien Rasmussen  :   1960  MRN:     [id number]  Physician: ED MCNAMARA BHAVYA Rasmussen may return to work with the following restrictions:   OK to lift up to 20 pounds to chest height with the right upper extremity  OK to lift up to 5 pounds over head with the right upper extremity  No repetitive overhead push pull activities with the right upper extremity    The next clinic appointment is scheduled for (date/time) 6 weeks.    Please call the clinic with any questions,      Electronically signed by ED MCNAMARA MD

## 2024-12-04 NOTE — LETTER
12/4/2024      Damien Rasmussen  [address] [address]      Dear Colleague,    Thank you for referring your patient, Damien Rasmussen, to the [facility] [place]. Please see a copy of my visit note below.    CHIEF COMPLAINT: Status post   1. Right shoulder arthroscopic massive rotator cuff repair with allograft augmentation.   2. Right shoulder arthroscopic glenohumeral debridement, extensive  3. Right shoulder subacromial bursectomy   4. Right shoulder arthroscopic biceps tenotomy  5.  Right shoulder manipulation under anesthesia     DATE OF SURGERY: 6/25/24    HISTORY OF PRESENT ILLNESS: Damien is an extremely pleasant 63 year-old right-hand dominant male who is 6 month status post the above procedure.  He reports minimal pain when he does over the out to the side activities SANE R - 85 L - 100    EXAM:  Pleasant adult 63 year old in no distress.  Respirations even and unlabored.  Right upper extremity: Incision healed. No erythema. No drainage. Sens intact Ax/Musc/Med/Rad/Uln nerves. Motor intact EPL, FPL, and Intrinsics.  Active forward elevation 150 degrees, abduction 140 degrees, external rotation of 40 degrees.   Does have some diffuse weakness in the rotator cuff    ASSESSMENT:  1. 6 month status post above procedure    PLAN:  Patient's QRC was present for the duration of this encounter.  I again discussed with the patient that he does have weakness in his rotator cuff, there is a high chance of the rotator cuff did not heal given the massive nature of the tear as well as allograft augmentation.  However he is minimally symptomatic we discussed that we repair would not be feasible also at this point were going to continue to follow his symptoms which are quite minimal.  He is going to continue to strengthen with physical therapy.  His work restrictions were updated to 5 pounds overhead with no repetitive overhead work, 20 pounds to the chest and waist.  I will follow-up  in another 6 weeks.  I anticipate return to full duty closer to 9 months.         Amalia Mcnamara  Orthopedic Surgery Sports Medicine and Shoulder Surgery      Again, thank you for allowing me to participate in the care of your patient.        Sincerely,        AMALIA MCNAMARA MD

## 2024-12-10 ENCOUNTER — THERAPY VISIT (OUTPATIENT)
Dept: PHYSICAL THERAPY | Facility: CLINIC | Age: 64
End: 2024-12-10
Payer: OTHER MISCELLANEOUS

## 2024-12-10 DIAGNOSIS — Z98.890 S/P RIGHT ROTATOR CUFF REPAIR: ICD-10-CM

## 2024-12-10 DIAGNOSIS — Z98.890 S/P ROTATOR CUFF REPAIR: ICD-10-CM

## 2024-12-10 DIAGNOSIS — M25.511 ACUTE PAIN OF RIGHT SHOULDER: Primary | ICD-10-CM

## 2024-12-10 PROCEDURE — 97140 MANUAL THERAPY 1/> REGIONS: CPT | Mod: GP | Performed by: PHYSICAL THERAPIST

## 2024-12-10 PROCEDURE — 97110 THERAPEUTIC EXERCISES: CPT | Mod: GP | Performed by: PHYSICAL THERAPIST

## 2025-01-03 NOTE — PROGRESS NOTES
CHIEF COMPLAINT: Status post   1. Right shoulder arthroscopic massive rotator cuff repair with allograft augmentation.   2. Right shoulder arthroscopic glenohumeral debridement, extensive  3. Right shoulder subacromial bursectomy   4. Right shoulder arthroscopic biceps tenotomy  5.  Right shoulder manipulation under anesthesia     DATE OF SURGERY: 6/25/24    HISTORY OF PRESENT ILLNESS: Damien is an extremely pleasant 63 year-old right-hand dominant male who is 7 month status post the above procedure.  He reports minimal pain when he does over the out to the side activities.  Patient reports that things are going very well. SANE R - 90 L - 100.    EXAM:  Pleasant adult 63 year old in no distress.  Respirations even and unlabored.  Right upper extremity: Incision healed. No erythema. No drainage. Sens intact Ax/Musc/Med/Rad/Uln nerves. Motor intact EPL, FPL, and Intrinsics.  Active forward elevation 150 degrees, abduction 140 degrees, external rotation of 40 degrees.   Does have some diffuse weakness in the rotator cuff    ASSESSMENT:  1. 7 month status post above procedure    PLAN:  Patient's QRC was present for the duration of this encounter.  I again discussed with the patient that he does have weakness in his rotator cuff, there is a high chance of the rotator cuff did not heal given the massive nature of the tear as well as allograft augmentation.  However he is minimally symptomatic we discussed that we repair would not be feasible also at this point were going to continue to follow his symptoms which are quite minimal.  He is going to continue with the current work restrictions until Monday, January 20 at which point he would be released to work full duty.  I am then going to follow-up with him in 6 weeks to see how he is tolerating full duty.        Amalia Juan  Orthopedic Surgery Sports Medicine and Shoulder Surgery

## 2025-01-07 ENCOUNTER — THERAPY VISIT (OUTPATIENT)
Dept: PHYSICAL THERAPY | Facility: CLINIC | Age: 65
End: 2025-01-07
Payer: COMMERCIAL

## 2025-01-07 DIAGNOSIS — M25.511 ACUTE PAIN OF RIGHT SHOULDER: Primary | ICD-10-CM

## 2025-01-07 DIAGNOSIS — Z98.890 S/P RIGHT ROTATOR CUFF REPAIR: ICD-10-CM

## 2025-01-07 PROCEDURE — 97110 THERAPEUTIC EXERCISES: CPT | Mod: GP | Performed by: PHYSICAL THERAPIST

## 2025-01-07 PROCEDURE — 97140 MANUAL THERAPY 1/> REGIONS: CPT | Mod: GP | Performed by: PHYSICAL THERAPIST

## 2025-01-07 NOTE — Clinical Note
Damien Brand is feeling great and really happy with how his shoulder feels and is functioning.  He has made a lot of progress with his AROM and mechanics to go along with the strength gains.  He still has a little ways to go with strengthening but overall he's doing awesome.  He'll see you next week and my plan is to see him every 3 weeks or so to progress his strengthening.  Let me know if you have any concerns.  Thanks!  Livan Conti, RADHAT, SCS [facility] [place] and ANTONI MCNALLY

## 2025-01-07 NOTE — PROGRESS NOTES
01/07/25 0500   Appointment Info   Signing clinician's name / credentials Livan Asuma, DPT, SCS   Total/Authorized Visits 30 authorized   Visits Used 15   Medical Diagnosis s/p right rotator cuff repair; right shoulder pain   PT Tx Diagnosis s/p right rotator cuff repair; right shoulder pain   Progress Note/Certification   Onset of illness/injury or Date of Surgery 06/25/24   Therapy Frequency 1x/week   Predicted Duration 12 weeks   Progress Note Due Date 10/01/24   Progress Note Completed Date 09/12/24   GOALS   PT Goals 2   PT Goal 1   Goal Identifier Self cares   Goal Description Patient will be able to perform all self cares using right arm with 0-1/10 pain.   Rationale to maximize safety and independence with performance of ADLs and functional tasks;to maximize safety and independence within the home;to maximize safety and independence within the community;to maximize safety and independence with self cares   Goal Progress all self cares done with either arm   Target Date 10/29/24   Date Met 11/14/24   PT Goal 2   Goal Identifier Lifting   Goal Description patient will be able to lift 5 lb with right arm to above shoulder level   Rationale to maximize safety and independence with performance of ADLs and functional tasks;to maximize safety and independence within the home;to maximize safety and independence within the community;to maximize safety and independence with self cares   Goal Progress can lift 1-2 lb   Target Date 02/06/25   Subjective Report   Subjective Report Damien reports his shoulder is feeling really good.  He is very pleased with the progress he is making and having no pain.   Objective Measure 1   Objective Measure AROM   Details R shoulder PROM: flexion 165 abduction 165 ER 70  IR T11   Objective Measure 2   Objective Measure Strength   Details R shoulder strength:  flexion 4+/5 scaption 4/5  ER 5-/5  IR 5-/5   Therapeutic Procedure/Exercise   Therapeutic Procedures: strength, endurance,  "ROM, flexibility minutes (97055) 32   Ther Proc 1 UBE   Ther Proc 1 - Details x5 min   Ther Proc 2 manual resisted abduction and flexon in supine   Ther Proc 2 - Details 5\" hold x10 each   Ther Proc 3 SL shoulder ER   Ther Proc 3 - Details manual resist 5\" x10   Ther Proc 4 serratus slides   Ther Proc 4 - Details yellow band x30   Ther Proc 5 lat pull down   Ther Proc 5 - Details 7 plates x20   Ther Proc 6 row   Ther Proc 6 - Details 7 plates x20   Ther Proc 7 incline db press   Ther Proc 7 - Details 7 lb x30   Ther Proc 8 standing shoulder flexion with yellow loop at wrists   Ther Proc 8 - Details x20   Ther Proc 9 kettle bell 90/90 hold   Ther Proc 9 - Details 20\" x2   Ther Proc 10 wall ball dribble   Ther Proc 10 - Details 30\" x2   Skilled Intervention exercise progression and instruction, PROM   Patient Response/Progress increased ROM, no pain   Manual Therapy   Manual Therapy: Mobilization, MFR, MLD, friction massage minutes (89162) 8   Manual Therapy 1 MFR to posterior axilla   Manual Therapy 1 - Details x3 min   Manual Therapy 2 GH mobs Gr III   Manual Therapy 2 - Details x5 min   Skilled Intervention manual skills   Patient Response/Progress decreased tension/tone in upper traps/posteriro shoulder   Education   Learner/Method Patient;Pictures/Video   Plan   Plan for next session progress with cuff strength, continue ROM/capsular mobility   Total Session Time   Timed Code Treatment Minutes 40   Total Treatment Time (sum of timed and untimed services) 40         PLAN  Continue therapy per current plan of care.    Beginning/End Dates of Progress Note Reporting Period:  09/12/24 to 01/07/2025    Referring Provider:  Amalia Juan    "

## 2025-01-15 ENCOUNTER — OFFICE VISIT (OUTPATIENT)
Dept: ORTHOPEDICS | Facility: CLINIC | Age: 65
End: 2025-01-15
Payer: COMMERCIAL

## 2025-01-15 DIAGNOSIS — Z98.890 S/P ROTATOR CUFF REPAIR: Primary | ICD-10-CM

## 2025-01-15 NOTE — LETTER
1/15/2025      Damien Rasmussen  [address] [address]      Dear Colleague,    Thank you for referring your patient, Damien Rasmussen, to the [facility] [place]. Please see a copy of my visit note below.    CHIEF COMPLAINT: Status post   1. Right shoulder arthroscopic massive rotator cuff repair with allograft augmentation.   2. Right shoulder arthroscopic glenohumeral debridement, extensive  3. Right shoulder subacromial bursectomy   4. Right shoulder arthroscopic biceps tenotomy  5.  Right shoulder manipulation under anesthesia     DATE OF SURGERY: 6/25/24    HISTORY OF PRESENT ILLNESS: Damien is an extremely pleasant 63 year-old right-hand dominant male who is 7 month status post the above procedure.  He reports minimal pain when he does over the out to the side activities.  Patient reports that things are going very well. SANE R - 90 L - 100.    EXAM:  Pleasant adult 63 year old in no distress.  Respirations even and unlabored.  Right upper extremity: Incision healed. No erythema. No drainage. Sens intact Ax/Musc/Med/Rad/Uln nerves. Motor intact EPL, FPL, and Intrinsics.  Active forward elevation 150 degrees, abduction 140 degrees, external rotation of 40 degrees.   Does have some diffuse weakness in the rotator cuff    ASSESSMENT:  1. 7 month status post above procedure    PLAN:  Patient's QRC was present for the duration of this encounter.  I again discussed with the patient that he does have weakness in his rotator cuff, there is a high chance of the rotator cuff did not heal given the massive nature of the tear as well as allograft augmentation.  However he is minimally symptomatic we discussed that we repair would not be feasible also at this point were going to continue to follow his symptoms which are quite minimal.  He is going to continue with the current work restrictions until Monday, January 20 at which point he would be released to work full duty.  I am then  going to follow-up with him in 6 weeks to see how he is tolerating full duty.        Amalia Mcnamara  Orthopedic Surgery Sports Medicine and Shoulder Surgery      Again, thank you for allowing me to participate in the care of your patient.        Sincerely,        AMALIA MCNAMARA MD    Electronically signed

## 2025-01-15 NOTE — LETTER
January 15, 2025     Seen today: Yes    Patient:  Damien Rasmussen  :   1960  MRN:     [id number]  Physician: ED MCNAMARA    Damien Rasmussen may return to work and continue the current work restrictions until 25.  On 25 he will be able to work full duty with no restrictions.      The next clinic appointment is scheduled for (date/time) 6 weeks.    Please call the clinic with any questions.      Electronically signed by ED MCNAMARA MD

## 2025-01-28 ENCOUNTER — THERAPY VISIT (OUTPATIENT)
Dept: PHYSICAL THERAPY | Facility: CLINIC | Age: 65
End: 2025-01-28
Payer: OTHER MISCELLANEOUS

## 2025-01-28 DIAGNOSIS — Z98.890 S/P RIGHT ROTATOR CUFF REPAIR: ICD-10-CM

## 2025-01-28 DIAGNOSIS — M25.511 ACUTE PAIN OF RIGHT SHOULDER: Primary | ICD-10-CM

## 2025-01-28 PROCEDURE — 97110 THERAPEUTIC EXERCISES: CPT | Mod: GP | Performed by: PHYSICAL THERAPIST

## 2025-02-18 ENCOUNTER — THERAPY VISIT (OUTPATIENT)
Dept: PHYSICAL THERAPY | Facility: CLINIC | Age: 65
End: 2025-02-18
Payer: OTHER MISCELLANEOUS

## 2025-02-18 DIAGNOSIS — M25.511 ACUTE PAIN OF RIGHT SHOULDER: Primary | ICD-10-CM

## 2025-02-18 DIAGNOSIS — Z98.890 S/P RIGHT ROTATOR CUFF REPAIR: ICD-10-CM

## 2025-02-18 PROCEDURE — 97140 MANUAL THERAPY 1/> REGIONS: CPT | Mod: GP | Performed by: PHYSICAL THERAPIST

## 2025-02-18 PROCEDURE — 97110 THERAPEUTIC EXERCISES: CPT | Mod: GP | Performed by: PHYSICAL THERAPIST

## 2025-03-11 ENCOUNTER — THERAPY VISIT (OUTPATIENT)
Dept: PHYSICAL THERAPY | Facility: CLINIC | Age: 65
End: 2025-03-11
Payer: OTHER MISCELLANEOUS

## 2025-03-11 DIAGNOSIS — Z98.890 S/P RIGHT ROTATOR CUFF REPAIR: ICD-10-CM

## 2025-03-11 DIAGNOSIS — M25.511 ACUTE PAIN OF RIGHT SHOULDER: Primary | ICD-10-CM

## 2025-03-11 PROCEDURE — 97110 THERAPEUTIC EXERCISES: CPT | Mod: GP | Performed by: PHYSICAL THERAPIST

## 2025-03-11 NOTE — PROGRESS NOTES
03/11/25 0500   Appointment Info   Signing clinician's name / credentials Livan Asuma, DPT, SCS   Total/Authorized Visits 30 authorized   Visits Used 18   Medical Diagnosis s/p right rotator cuff repair; right shoulder pain   PT Tx Diagnosis s/p right rotator cuff repair; right shoulder pain   Progress Note/Certification   Onset of illness/injury or Date of Surgery 06/25/24   Therapy Frequency 1x/week   Predicted Duration 12 weeks   Progress Note Due Date 10/01/24   Progress Note Completed Date 09/12/24   PT Goal 1   Goal Identifier Self cares   Goal Description Patient will be able to perform all self cares using right arm with 0-1/10 pain.   Rationale to maximize safety and independence with performance of ADLs and functional tasks;to maximize safety and independence within the home;to maximize safety and independence within the community;to maximize safety and independence with self cares   Goal Progress all self cares done with either arm   Target Date 10/29/24   Date Met 11/14/24   PT Goal 2   Goal Identifier Lifting   Goal Description patient will be able to lift 5 lb with right arm to above shoulder level   Rationale to maximize safety and independence with performance of ADLs and functional tasks;to maximize safety and independence within the home;to maximize safety and independence within the community;to maximize safety and independence with self cares   Goal Progress can lift up to 5 lb   Target Date 02/06/25   Date Met 03/11/25   Subjective Report   Subjective Report Damien reports his shoulder continues to improve and feel better all the time.  He can tell there is still some deficit but feels like it can keep improving with time and his exercises.   Objective Measure 1   Objective Measure AROM   Details R shoulder PROM: flexion 165 abduction 165 ER 70  IR T11   Objective Measure 2   Objective Measure Strength   Details R shoulder strength:  flexion 5-/5 scaption 5-/5  ER 5/5  IR 5/5   Therapeutic  "Procedure/Exercise   Therapeutic Procedures: strength, endurance, ROM, flexibility minutes (94109) 30   Ther Proc 1 UBE   Ther Proc 1 - Details x5 min   Ther Proc 4 wall dribbles on wall   Ther Proc 4 - Details 30\" x3   Ther Proc 5 8 lb incline press   Ther Proc 5 - Details 2x20   Ther Proc 6 15 lb dumbell curl   Ther Proc 6 - Details 2x15-20   Skilled Intervention exercise progression and instruction, PROM   Patient Response/Progress increased ROM, no pain   Ther Proc 2 3 lb front raise and hold   Ther Proc 2 - Details 1.5 min x2   Ther Proc 3 3 lb corner raise and hold   Ther Proc 3 - Details 30-45\" hold x2   Education   Learner/Method Patient;Pictures/Video   Plan   Updates to plan of care continue independently with HEP   Total Session Time   Timed Code Treatment Minutes 30   Total Treatment Time (sum of timed and untimed services) 30         PLAN  Damien will continue his HEP independently over the next 3 months and contact PT to discuss follow up if his shoulder starts to perform poorly or he has problems.  If he has no problems during that time he will be discharged from PT.    Beginning/End Dates of Progress Note Reporting Period:  09/12/24 to 03/11/2025    Referring Provider:  Amalia Juan    "

## 2025-06-08 ENCOUNTER — HEALTH MAINTENANCE LETTER (OUTPATIENT)
Age: 65
End: 2025-06-08

## (undated) DEVICE — DRSG STERI STRIP 1/2X4" R1547

## (undated) DEVICE — SU FIBERWIRE 2 38"  AR-7200

## (undated) DEVICE — ABLATOR ARTHREX APOLLO RF MP90 ASPIRATING 90DEG AR-9811

## (undated) DEVICE — SU MONOCRYL 3-0 PS-1 27" Y936H

## (undated) DEVICE — DRAPE MAYO STAND 23X54 8337

## (undated) DEVICE — IMPLANTABLE DEVICE
Type: IMPLANTABLE DEVICE | Site: SHOULDER | Status: NON-FUNCTIONAL
Brand: FIBERSTITCH™ IMPLANT 1.5, STRAIGHT (RC) WITH TWO POLYESTER IMPLANTS AND 2-0 FIBE

## (undated) DEVICE — TUBING SYSTEM ARTHREX PATIENT REDEUCE AR-6421

## (undated) DEVICE — PREP CHLORAPREP 26ML TINTED HI-LITE ORANGE 930815

## (undated) DEVICE — SYR 10ML LL W/O NDL

## (undated) DEVICE — Device

## (undated) DEVICE — ARTHROSCOPIC CANNULA TWIST-IN PURPLE 7MMX7CM AR-6570

## (undated) DEVICE — STRAP STIRRUP W/SLIP 30187-030

## (undated) DEVICE — PACK SHOULDER CUSTOM ASC SOP15ASUMA

## (undated) DEVICE — SUCTION MANIFOLD NEPTUNE 2 SYS 4 PORT 0702-020-000

## (undated) DEVICE — GLOVE BIOGEL PI MICRO INDICATOR UNDERGLOVE SZ 6.5 48965

## (undated) DEVICE — DRAPE STERI U 1015

## (undated) DEVICE — DRAPE STERI TOWEL LG 1010

## (undated) DEVICE — PAD FLOOR SURGSAFE 30X40" 83030

## (undated) DEVICE — SPREADER SURGICAL GRAFT AR-19007GS

## (undated) DEVICE — TUBING SYSTEM ARTHREX PUMP REDEUCE AR-6411

## (undated) DEVICE — DRAPE IOBAN INCISE 23X17" 6650EZ

## (undated) DEVICE — SOL NACL 0.9% IRRIG 3000ML BAG 2B7477

## (undated) DEVICE — IMM KIT SHOULDER STABILIZATION 7210573

## (undated) DEVICE — SU NDL MAYO TROCAR 217005

## (undated) DEVICE — DRAPE ARTHROSCOPY SHOULDER BEACHCHAIR 29369

## (undated) DEVICE — BUR ARTHREX COOLCUT EXCALIBUR 4.0MMX13CM AR-8400EX

## (undated) DEVICE — IMM KIT SHOULDER TMAX MASK FACE 7210559

## (undated) DEVICE — GLOVE BIOGEL PI MICRO SZ 6.5 48565

## (undated) RX ORDER — ACETAMINOPHEN 325 MG/1
TABLET ORAL
Status: DISPENSED
Start: 2024-06-25

## (undated) RX ORDER — FENTANYL CITRATE 50 UG/ML
INJECTION, SOLUTION INTRAMUSCULAR; INTRAVENOUS
Status: DISPENSED
Start: 2024-06-25

## (undated) RX ORDER — PROPOFOL 10 MG/ML
INJECTION, EMULSION INTRAVENOUS
Status: DISPENSED
Start: 2024-06-25

## (undated) RX ORDER — EPINEPHRINE NASAL SOLUTION 1 MG/ML
SOLUTION NASAL
Status: DISPENSED
Start: 2024-06-25

## (undated) RX ORDER — CEFAZOLIN SODIUM 2 G/50ML
SOLUTION INTRAVENOUS
Status: DISPENSED
Start: 2024-06-25

## (undated) RX ORDER — GLYCOPYRROLATE 0.2 MG/ML
INJECTION INTRAMUSCULAR; INTRAVENOUS
Status: DISPENSED
Start: 2024-06-25

## (undated) RX ORDER — TRANEXAMIC ACID 100 MG/ML
INJECTION, SOLUTION INTRAVENOUS
Status: DISPENSED
Start: 2024-06-25

## (undated) RX ORDER — ONDANSETRON 2 MG/ML
INJECTION INTRAMUSCULAR; INTRAVENOUS
Status: DISPENSED
Start: 2024-06-25

## (undated) RX ORDER — FENTANYL CITRATE-0.9 % NACL/PF 10 MCG/ML
PLASTIC BAG, INJECTION (ML) INTRAVENOUS
Status: DISPENSED
Start: 2024-06-25

## (undated) RX ORDER — DEXAMETHASONE SODIUM PHOSPHATE 4 MG/ML
INJECTION, SOLUTION INTRA-ARTICULAR; INTRALESIONAL; INTRAMUSCULAR; INTRAVENOUS; SOFT TISSUE
Status: DISPENSED
Start: 2024-06-25